# Patient Record
Sex: MALE | Race: OTHER | HISPANIC OR LATINO | Employment: FULL TIME | URBAN - METROPOLITAN AREA
[De-identification: names, ages, dates, MRNs, and addresses within clinical notes are randomized per-mention and may not be internally consistent; named-entity substitution may affect disease eponyms.]

---

## 2019-09-29 ENCOUNTER — APPOINTMENT (EMERGENCY)
Dept: RADIOLOGY | Facility: HOSPITAL | Age: 39
End: 2019-09-29

## 2019-09-29 ENCOUNTER — HOSPITAL ENCOUNTER (EMERGENCY)
Facility: HOSPITAL | Age: 39
Discharge: HOME | End: 2019-09-29
Attending: EMERGENCY MEDICINE

## 2019-09-29 VITALS
SYSTOLIC BLOOD PRESSURE: 119 MMHG | DIASTOLIC BLOOD PRESSURE: 67 MMHG | OXYGEN SATURATION: 98 % | HEART RATE: 78 BPM | TEMPERATURE: 97.6 F | RESPIRATION RATE: 18 BRPM

## 2019-09-29 DIAGNOSIS — L03.115 CELLULITIS OF RIGHT FOOT: Primary | ICD-10-CM

## 2019-09-29 PROCEDURE — 99283 EMERGENCY DEPT VISIT LOW MDM: CPT

## 2019-09-29 PROCEDURE — 73630 X-RAY EXAM OF FOOT: CPT

## 2019-09-29 PROCEDURE — 99284 EMERGENCY DEPT VISIT MOD MDM: CPT | Performed by: EMERGENCY MEDICINE

## 2019-09-29 RX ORDER — CEPHALEXIN 250 MG/1
500 CAPSULE ORAL ONCE
Status: COMPLETED | OUTPATIENT
Start: 2019-09-29 | End: 2019-09-29

## 2019-09-29 RX ORDER — SULFAMETHOXAZOLE AND TRIMETHOPRIM 800; 160 MG/1; MG/1
1 TABLET ORAL ONCE
Status: COMPLETED | OUTPATIENT
Start: 2019-09-29 | End: 2019-09-29

## 2019-09-29 RX ORDER — SULFAMETHOXAZOLE AND TRIMETHOPRIM 800; 160 MG/1; MG/1
1 TABLET ORAL 2 TIMES DAILY
Qty: 14 TABLET | Refills: 0 | Status: SHIPPED | OUTPATIENT
Start: 2019-09-29 | End: 2019-10-04 | Stop reason: HOSPADM

## 2019-09-29 RX ORDER — CEPHALEXIN 250 MG/1
500 CAPSULE ORAL EVERY 6 HOURS SCHEDULED
Qty: 56 CAPSULE | Refills: 0 | Status: SHIPPED | OUTPATIENT
Start: 2019-09-29 | End: 2019-10-04 | Stop reason: HOSPADM

## 2019-09-29 RX ADMIN — SULFAMETHOXAZOLE AND TRIMETHOPRIM 1 TABLET: 800; 160 TABLET ORAL at 16:41

## 2019-09-29 RX ADMIN — CEPHALEXIN 500 MG: 250 CAPSULE ORAL at 16:41

## 2019-09-29 NOTE — ED PROVIDER NOTES
Pt Name: Shelly Knight  MRN: [id number]  [place] 1980  Age/Sex: 35 y o  male  Date of evaluation: 9/29/2019  PCP: No primary care provider on file  CHIEF COMPLAINT    Chief Complaint   Patient presents with    Foot Swelling     pt c/o right foot swelling and pain for the past 2 days  HPI    35 y o  male presenting with 2 days of right foot pain and swelling  Patient notes pain and itching between the 4th and 5th toes on the right foot, with increasing pain and swelling and redness that began today  He denies fevers, nausea vomiting diarrhea, chest pain shortness of breath, other symptoms  HPI      Past Medical and Surgical History    History reviewed  No pertinent past medical history  History reviewed  No pertinent surgical history  History reviewed  No pertinent family history  Social History     Tobacco Use    Smoking status: Never Smoker    Smokeless tobacco: Never Used   Substance Use Topics    Alcohol use: Not on file    Drug use: Not on file           Allergies    No Known Allergies    Home Medications    Prior to Admission medications    Not on File           Review of Systems    Review of Systems   Constitutional: Negative for appetite change, chills and diaphoresis  HENT: Negative for drooling, facial swelling, trouble swallowing and voice change  Respiratory: Negative for apnea, shortness of breath and wheezing  Cardiovascular: Negative for chest pain and leg swelling  Gastrointestinal: Negative for abdominal distention, abdominal pain, diarrhea, nausea and vomiting  Genitourinary: Negative for dysuria and urgency  Musculoskeletal: Positive for back pain and joint swelling  Negative for arthralgias, gait problem and neck pain  Skin: Negative for color change, rash and wound  Neurological: Negative for seizures, speech difficulty, weakness and headaches  Psychiatric/Behavioral: Negative for agitation, behavioral problems and dysphoric mood   The patient is not nervous/anxious  All other systems reviewed and negative  Physical Exam      ED Triage Vitals [09/29/19 1550]   Temperature Pulse Respirations Blood Pressure SpO2   97 6 °F (36 4 °C) 78 18 119/67 98 %      Temp Source Heart Rate Source Patient Position - Orthostatic VS BP Location FiO2 (%)   Oral Monitor Lying Left arm --      Pain Score       --               Physical Exam   Constitutional: He is oriented to person, place, and time  He appears well-developed and well-nourished  HENT:   Head: Normocephalic and atraumatic  Eyes: Pupils are equal, round, and reactive to light  Conjunctivae and EOM are normal    Neck: Normal range of motion  Neck supple  No tracheal deviation present  Cardiovascular: Normal rate, regular rhythm, normal heart sounds and intact distal pulses  No murmur heard  Pulmonary/Chest: Effort normal and breath sounds normal  No stridor  No respiratory distress  He has no wheezes  He has no rales  Abdominal: Soft  He exhibits no distension  There is no tenderness  There is no rebound and no guarding  Musculoskeletal: Normal range of motion  He exhibits edema and tenderness  He exhibits no deformity  Lateral aspect of right foot erythematous with tenderness to palpation but no pain out of proportion  No palpable fluctuance  Break in the skin and macerated appearing tissue noted between the 4th and 5th digits, no purulent drainage or deep open wound  Neurological: He is alert and oriented to person, place, and time  Skin: Skin is warm and dry  No rash noted  There is erythema  Psychiatric: He has a normal mood and affect  His behavior is normal  Judgment and thought content normal    Nursing note and vitals reviewed  Diagnostic Results      Labs:    No results found for this or any previous visit      All labs reviewed and utilized in the medical decision making process    Radiology:    XR foot 3+ views RIGHT    (Results Pending)       All radiology studies independently viewed by me and interpreted by the radiologist     Procedure    Procedures        ED Course of Care and Re-Assessments      Patient started on Bactrim and Keflex for cellulitis  Medications   sulfamethoxazole-trimethoprim (BACTRIM DS) 800-160 mg per tablet 1 tablet (1 tablet Oral Given 9/29/19 1641)   cephalexin (KEFLEX) capsule 500 mg (500 mg Oral Given 9/29/19 1641)           FINAL IMPRESSION    Final diagnoses:   Cellulitis of right foot         DISPOSITION/PLAN    Presentation most consistent with cellulitis of the right foot likely started by break in skin from chronically wet and macerated tissue  Patient counseled regarding keeping the area clean as well as dry, toes  with gauze to provide for good airflow, recommended wearing with or without sites of into the wound heals  Started on Bactrim and Keflex for cellulitis no area of fluctuance identified amenable to surgical drainage, plain films reassuring  Do not suspect abscess, osteomyelitis, sepsis, necrotizing soft tissue infection, other acute threat to life or limb at this time  Discharged strict return precautions, follow up with primary care doctor in podiatry as needed  Time reflects when diagnosis was documented in both MDM as applicable and the Disposition within this note     Time User Action Codes Description Comment    9/29/2019  5:09 PM Steven Prieto Add [L03 115] Cellulitis of right foot       ED Disposition     ED Disposition Condition Date/Time Comment    Discharge Stable Sun Sep 29, 2019  4:41 PM Stanley Guillen discharge to home/self care              Follow-up Information     Follow up With Specialties Details Why Contact Info Additional Information    [facility]  Call in 1 day To schedule followup if your symptoms are not improving over the next 48-72 hours [address] Emergency Department Emergency Medicine Go to  If symptoms worsen [address] [facility] [address]  [phone] [place] ED, [address] [facility], [address], [address]            PATIENT REFERRED TO:    [address] [facility] [address]  [address] [facility] [address] [phone]  Call in 1 day  To schedule followup if your symptoms are not improving over the next 48-72 hours    [facility] Emergency Department  [address] [facility] [id number] [phone]  Go to   If symptoms worsen      DISCHARGE MEDICATIONS:    Discharge Medication List as of 9/29/2019  5:11 PM      START taking these medications    Details   cephalexin (KEFLEX) 250 mg capsule Take 2 capsules (500 mg total) by mouth every 6 (six) hours for 7 days, Starting Sun 9/29/2019, Until Sun 10/6/2019, Print      sulfamethoxazole-trimethoprim (BACTRIM DS) 800-160 mg per tablet Take 1 tablet by mouth 2 (two) times a day for 7 days smx-tmp DS (BACTRIM) 800-160 mg tabs (1tab q12 D10), Starting Sun 9/29/2019, Until Sun 10/6/2019, Print             No discharge procedures on file           MD Susan Cotton MD  09/29/19 436

## 2019-10-02 ENCOUNTER — APPOINTMENT (OUTPATIENT)
Dept: RADIOLOGY | Facility: HOSPITAL | Age: 39
DRG: 603 | End: 2019-10-02

## 2019-10-02 ENCOUNTER — HOSPITAL ENCOUNTER (INPATIENT)
Facility: HOSPITAL | Age: 39
LOS: 1 days | Discharge: HOME | DRG: 603 | End: 2019-10-04
Attending: EMERGENCY MEDICINE | Admitting: INTERNAL MEDICINE

## 2019-10-02 DIAGNOSIS — L03.031 CELLULITIS AND ABSCESS OF TOE OF RIGHT FOOT: ICD-10-CM

## 2019-10-02 DIAGNOSIS — L02.611 CELLULITIS AND ABSCESS OF TOE OF RIGHT FOOT: ICD-10-CM

## 2019-10-02 DIAGNOSIS — L03.119 CELLULITIS AND ABSCESS OF FOOT: Primary | ICD-10-CM

## 2019-10-02 DIAGNOSIS — L02.619 CELLULITIS AND ABSCESS OF FOOT: Primary | ICD-10-CM

## 2019-10-02 PROBLEM — B35.3 TINEA PEDIS: Status: ACTIVE | Noted: 2019-10-02

## 2019-10-02 PROBLEM — L03.032 CELLULITIS AND ABSCESS OF TOE OF LEFT FOOT: Status: ACTIVE | Noted: 2019-10-02

## 2019-10-02 PROBLEM — L02.612 CELLULITIS AND ABSCESS OF TOE OF LEFT FOOT: Status: ACTIVE | Noted: 2019-10-02

## 2019-10-02 LAB
ALBUMIN SERPL BCP-MCNC: 3.3 G/DL (ref 3.5–5)
ALBUMIN SERPL BCP-MCNC: 3.8 G/DL (ref 3.5–5)
ALP SERPL-CCNC: 58 U/L (ref 46–116)
ALP SERPL-CCNC: 65 U/L (ref 46–116)
ALT SERPL W P-5'-P-CCNC: 23 U/L (ref 12–78)
ALT SERPL W P-5'-P-CCNC: 30 U/L (ref 12–78)
ANION GAP SERPL CALCULATED.3IONS-SCNC: 12 MMOL/L (ref 4–13)
ANION GAP SERPL CALCULATED.3IONS-SCNC: 8 MMOL/L (ref 4–13)
AST SERPL W P-5'-P-CCNC: 14 U/L (ref 5–45)
AST SERPL W P-5'-P-CCNC: 19 U/L (ref 5–45)
BASOPHILS # BLD AUTO: 0.06 THOUSANDS/ΜL (ref 0–0.1)
BASOPHILS # BLD AUTO: 0.06 THOUSANDS/ΜL (ref 0–0.1)
BASOPHILS NFR BLD AUTO: 0 % (ref 0–1)
BASOPHILS NFR BLD AUTO: 1 % (ref 0–1)
BILIRUB SERPL-MCNC: 0.5 MG/DL (ref 0.2–1)
BILIRUB SERPL-MCNC: 0.6 MG/DL (ref 0.2–1)
BUN SERPL-MCNC: 5 MG/DL (ref 5–25)
BUN SERPL-MCNC: 7 MG/DL (ref 5–25)
CALCIUM SERPL-MCNC: 8.2 MG/DL (ref 8.3–10.1)
CALCIUM SERPL-MCNC: 8.8 MG/DL (ref 8.3–10.1)
CHLORIDE SERPL-SCNC: 102 MMOL/L (ref 100–108)
CHLORIDE SERPL-SCNC: 104 MMOL/L (ref 100–108)
CO2 SERPL-SCNC: 25 MMOL/L (ref 21–32)
CO2 SERPL-SCNC: 26 MMOL/L (ref 21–32)
CREAT SERPL-MCNC: 1.07 MG/DL (ref 0.6–1.3)
CREAT SERPL-MCNC: 1.26 MG/DL (ref 0.6–1.3)
EOSINOPHIL # BLD AUTO: 0.21 THOUSAND/ΜL (ref 0–0.61)
EOSINOPHIL # BLD AUTO: 0.22 THOUSAND/ΜL (ref 0–0.61)
EOSINOPHIL NFR BLD AUTO: 2 % (ref 0–6)
EOSINOPHIL NFR BLD AUTO: 2 % (ref 0–6)
ERYTHROCYTE [DISTWIDTH] IN BLOOD BY AUTOMATED COUNT: 12.6 % (ref 11.6–15.1)
ERYTHROCYTE [DISTWIDTH] IN BLOOD BY AUTOMATED COUNT: 12.7 % (ref 11.6–15.1)
EST. AVERAGE GLUCOSE BLD GHB EST-MCNC: 120 MG/DL
GFR SERPL CREATININE-BSD FRML MDRD: 71 ML/MIN/1.73SQ M
GFR SERPL CREATININE-BSD FRML MDRD: 86 ML/MIN/1.73SQ M
GLUCOSE SERPL-MCNC: 110 MG/DL (ref 65–140)
GLUCOSE SERPL-MCNC: 113 MG/DL (ref 65–140)
HBA1C MFR BLD: 5.8 % (ref 4.2–6.3)
HCT VFR BLD AUTO: 41.6 % (ref 36.5–49.3)
HCT VFR BLD AUTO: 45.8 % (ref 36.5–49.3)
HGB BLD-MCNC: 13.9 G/DL (ref 12–17)
HGB BLD-MCNC: 15.2 G/DL (ref 12–17)
IMM GRANULOCYTES # BLD AUTO: 0.05 THOUSAND/UL (ref 0–0.2)
IMM GRANULOCYTES # BLD AUTO: 0.08 THOUSAND/UL (ref 0–0.2)
IMM GRANULOCYTES NFR BLD AUTO: 0 % (ref 0–2)
IMM GRANULOCYTES NFR BLD AUTO: 1 % (ref 0–2)
LYMPHOCYTES # BLD AUTO: 2.84 THOUSANDS/ΜL (ref 0.6–4.47)
LYMPHOCYTES # BLD AUTO: 2.91 THOUSANDS/ΜL (ref 0.6–4.47)
LYMPHOCYTES NFR BLD AUTO: 20 % (ref 14–44)
LYMPHOCYTES NFR BLD AUTO: 22 % (ref 14–44)
MAGNESIUM SERPL-MCNC: 2.1 MG/DL (ref 1.6–2.6)
MCH RBC QN AUTO: 29.9 PG (ref 26.8–34.3)
MCH RBC QN AUTO: 29.9 PG (ref 26.8–34.3)
MCHC RBC AUTO-ENTMCNC: 33.2 G/DL (ref 31.4–37.4)
MCHC RBC AUTO-ENTMCNC: 33.4 G/DL (ref 31.4–37.4)
MCV RBC AUTO: 90 FL (ref 82–98)
MCV RBC AUTO: 90 FL (ref 82–98)
MONOCYTES # BLD AUTO: 1.13 THOUSAND/ΜL (ref 0.17–1.22)
MONOCYTES # BLD AUTO: 1.27 THOUSAND/ΜL (ref 0.17–1.22)
MONOCYTES NFR BLD AUTO: 10 % (ref 4–12)
MONOCYTES NFR BLD AUTO: 8 % (ref 4–12)
NEUTROPHILS # BLD AUTO: 8.47 THOUSANDS/ΜL (ref 1.85–7.62)
NEUTROPHILS # BLD AUTO: 9.84 THOUSANDS/ΜL (ref 1.85–7.62)
NEUTS SEG NFR BLD AUTO: 65 % (ref 43–75)
NEUTS SEG NFR BLD AUTO: 69 % (ref 43–75)
NRBC BLD AUTO-RTO: 0 /100 WBCS
NRBC BLD AUTO-RTO: 0 /100 WBCS
PHOSPHATE SERPL-MCNC: 3.2 MG/DL (ref 2.7–4.5)
PLATELET # BLD AUTO: 331 THOUSANDS/UL (ref 149–390)
PLATELET # BLD AUTO: 380 THOUSANDS/UL (ref 149–390)
PMV BLD AUTO: 8.9 FL (ref 8.9–12.7)
PMV BLD AUTO: 9 FL (ref 8.9–12.7)
POTASSIUM SERPL-SCNC: 3.9 MMOL/L (ref 3.5–5.3)
POTASSIUM SERPL-SCNC: 4 MMOL/L (ref 3.5–5.3)
PROT SERPL-MCNC: 6.6 G/DL (ref 6.4–8.2)
PROT SERPL-MCNC: 7.6 G/DL (ref 6.4–8.2)
RBC # BLD AUTO: 4.65 MILLION/UL (ref 3.88–5.62)
RBC # BLD AUTO: 5.09 MILLION/UL (ref 3.88–5.62)
SODIUM SERPL-SCNC: 138 MMOL/L (ref 136–145)
SODIUM SERPL-SCNC: 139 MMOL/L (ref 136–145)
WBC # BLD AUTO: 12.9 THOUSAND/UL (ref 4.31–10.16)
WBC # BLD AUTO: 14.24 THOUSAND/UL (ref 4.31–10.16)

## 2019-10-02 PROCEDURE — 99285 EMERGENCY DEPT VISIT HI MDM: CPT | Performed by: EMERGENCY MEDICINE

## 2019-10-02 PROCEDURE — 87147 CULTURE TYPE IMMUNOLOGIC: CPT | Performed by: EMERGENCY MEDICINE

## 2019-10-02 PROCEDURE — 87070 CULTURE OTHR SPECIMN AEROBIC: CPT | Performed by: EMERGENCY MEDICINE

## 2019-10-02 PROCEDURE — 80053 COMPREHEN METABOLIC PANEL: CPT | Performed by: PHYSICIAN ASSISTANT

## 2019-10-02 PROCEDURE — 99220 PR INITIAL OBSERVATION CARE/DAY 70 MINUTES: CPT | Performed by: INTERNAL MEDICINE

## 2019-10-02 PROCEDURE — 0H9MXZZ DRAINAGE OF RIGHT FOOT SKIN, EXTERNAL APPROACH: ICD-10-PCS | Performed by: EMERGENCY MEDICINE

## 2019-10-02 PROCEDURE — 85025 COMPLETE CBC W/AUTO DIFF WBC: CPT | Performed by: PHYSICIAN ASSISTANT

## 2019-10-02 PROCEDURE — 36415 COLL VENOUS BLD VENIPUNCTURE: CPT | Performed by: EMERGENCY MEDICINE

## 2019-10-02 PROCEDURE — 83735 ASSAY OF MAGNESIUM: CPT | Performed by: PHYSICIAN ASSISTANT

## 2019-10-02 PROCEDURE — 99284 EMERGENCY DEPT VISIT MOD MDM: CPT

## 2019-10-02 PROCEDURE — 96365 THER/PROPH/DIAG IV INF INIT: CPT

## 2019-10-02 PROCEDURE — 73620 X-RAY EXAM OF FOOT: CPT

## 2019-10-02 PROCEDURE — 10060 I&D ABSCESS SIMPLE/SINGLE: CPT | Performed by: EMERGENCY MEDICINE

## 2019-10-02 PROCEDURE — 87040 BLOOD CULTURE FOR BACTERIA: CPT | Performed by: EMERGENCY MEDICINE

## 2019-10-02 PROCEDURE — 99254 IP/OBS CNSLTJ NEW/EST MOD 60: CPT | Performed by: INTERNAL MEDICINE

## 2019-10-02 PROCEDURE — 87186 SC STD MICRODIL/AGAR DIL: CPT | Performed by: EMERGENCY MEDICINE

## 2019-10-02 PROCEDURE — 85025 COMPLETE CBC W/AUTO DIFF WBC: CPT | Performed by: EMERGENCY MEDICINE

## 2019-10-02 PROCEDURE — 87205 SMEAR GRAM STAIN: CPT | Performed by: EMERGENCY MEDICINE

## 2019-10-02 PROCEDURE — 96375 TX/PRO/DX INJ NEW DRUG ADDON: CPT

## 2019-10-02 PROCEDURE — 84100 ASSAY OF PHOSPHORUS: CPT | Performed by: PHYSICIAN ASSISTANT

## 2019-10-02 PROCEDURE — 80053 COMPREHEN METABOLIC PANEL: CPT | Performed by: EMERGENCY MEDICINE

## 2019-10-02 PROCEDURE — 83036 HEMOGLOBIN GLYCOSYLATED A1C: CPT | Performed by: PHYSICIAN ASSISTANT

## 2019-10-02 PROCEDURE — 87077 CULTURE AEROBIC IDENTIFY: CPT | Performed by: EMERGENCY MEDICINE

## 2019-10-02 RX ORDER — ACETAMINOPHEN 325 MG/1
650 TABLET ORAL EVERY 6 HOURS PRN
Status: DISCONTINUED | OUTPATIENT
Start: 2019-10-02 | End: 2019-10-04 | Stop reason: HOSPADM

## 2019-10-02 RX ORDER — CLOTRIMAZOLE 1 %
CREAM (GRAM) TOPICAL 2 TIMES DAILY
Status: DISCONTINUED | OUTPATIENT
Start: 2019-10-02 | End: 2019-10-04 | Stop reason: HOSPADM

## 2019-10-02 RX ORDER — HYDROMORPHONE HCL/PF 1 MG/ML
0.5 SYRINGE (ML) INJECTION ONCE
Status: COMPLETED | OUTPATIENT
Start: 2019-10-02 | End: 2019-10-02

## 2019-10-02 RX ORDER — ONDANSETRON 2 MG/ML
4 INJECTION INTRAMUSCULAR; INTRAVENOUS ONCE
Status: COMPLETED | OUTPATIENT
Start: 2019-10-02 | End: 2019-10-02

## 2019-10-02 RX ORDER — HYDROMORPHONE HCL/PF 1 MG/ML
1 SYRINGE (ML) INJECTION EVERY 4 HOURS PRN
Status: DISCONTINUED | OUTPATIENT
Start: 2019-10-02 | End: 2019-10-04 | Stop reason: HOSPADM

## 2019-10-02 RX ORDER — HYDROMORPHONE HCL/PF 1 MG/ML
0.5 SYRINGE (ML) INJECTION EVERY 4 HOURS PRN
Status: DISCONTINUED | OUTPATIENT
Start: 2019-10-02 | End: 2019-10-04 | Stop reason: HOSPADM

## 2019-10-02 RX ORDER — LIDOCAINE HYDROCHLORIDE 20 MG/ML
10 INJECTION, SOLUTION EPIDURAL; INFILTRATION; INTRACAUDAL; PERINEURAL ONCE
Status: COMPLETED | OUTPATIENT
Start: 2019-10-02 | End: 2019-10-02

## 2019-10-02 RX ORDER — ONDANSETRON 2 MG/ML
4 INJECTION INTRAMUSCULAR; INTRAVENOUS EVERY 4 HOURS PRN
Status: DISCONTINUED | OUTPATIENT
Start: 2019-10-02 | End: 2019-10-04 | Stop reason: HOSPADM

## 2019-10-02 RX ORDER — OXYCODONE HYDROCHLORIDE AND ACETAMINOPHEN 5; 325 MG/1; MG/1
1 TABLET ORAL EVERY 4 HOURS PRN
Status: DISCONTINUED | OUTPATIENT
Start: 2019-10-02 | End: 2019-10-04 | Stop reason: HOSPADM

## 2019-10-02 RX ORDER — CLINDAMYCIN PHOSPHATE 600 MG/50ML
600 INJECTION INTRAVENOUS EVERY 6 HOURS
Status: DISCONTINUED | OUTPATIENT
Start: 2019-10-02 | End: 2019-10-02

## 2019-10-02 RX ORDER — CLINDAMYCIN PHOSPHATE 600 MG/50ML
600 INJECTION INTRAVENOUS ONCE
Status: COMPLETED | OUTPATIENT
Start: 2019-10-02 | End: 2019-10-02

## 2019-10-02 RX ADMIN — CLINDAMYCIN PHOSPHATE 600 MG: 600 INJECTION, SOLUTION INTRAVENOUS at 02:23

## 2019-10-02 RX ADMIN — ONDANSETRON 4 MG: 2 INJECTION INTRAMUSCULAR; INTRAVENOUS at 02:22

## 2019-10-02 RX ADMIN — SODIUM CHLORIDE 1000 ML: 0.9 INJECTION, SOLUTION INTRAVENOUS at 02:22

## 2019-10-02 RX ADMIN — VANCOMYCIN HYDROCHLORIDE 1250 MG: 5 INJECTION, POWDER, LYOPHILIZED, FOR SOLUTION INTRAVENOUS at 09:13

## 2019-10-02 RX ADMIN — VANCOMYCIN HYDROCHLORIDE 1250 MG: 5 INJECTION, POWDER, LYOPHILIZED, FOR SOLUTION INTRAVENOUS at 17:58

## 2019-10-02 RX ADMIN — LIDOCAINE HYDROCHLORIDE 10 ML: 20 INJECTION, SOLUTION EPIDURAL; INFILTRATION; INTRACAUDAL; PERINEURAL at 02:55

## 2019-10-02 RX ADMIN — HYDROMORPHONE HYDROCHLORIDE 0.5 MG: 1 INJECTION, SOLUTION INTRAMUSCULAR; INTRAVENOUS; SUBCUTANEOUS at 02:22

## 2019-10-02 RX ADMIN — CLOTRIMAZOLE: 10 CREAM TOPICAL at 18:02

## 2019-10-02 NOTE — ED NOTES
Lotrimin cream not received from [facility], RN  10/02/19 6684

## 2019-10-02 NOTE — ASSESSMENT & PLAN NOTE
· 54-year-old male with no prior past medical history presents with an abscess and cellulitis between 4th and 5th toe of the right foot, he failed outpatient antibiotics, will now be admitted to [facility] for IV antibiotics  · He was started on Cleocin in the ED, will continue q 6 hours  · Follow-up with blood culture and wound culture  · Podiatry consult  · Pain control  · Antiemetics  · Ambulate with assistance  · Supportive care

## 2019-10-02 NOTE — SOCIAL WORK
Cm met with patient at bedside, patient alert and oriented during interview  Patient reports residing in a private home that is functional for him, he is completely independent with his ADL's, no dme, and drives  No reports of MH/SA  Patient stated at this time he is not interested in following up with a PCP and is not on any current medications  Patient stated he can use either [facility] or [facility] for any discharge medications  Patient understands he may have follow appointments and will self pay  Cm team will continue to follow and assess for needs  CM reviewed discharge planning process including the following: identifying help at home, patient preference for discharge planning needs, pharmacy preference, and availability of treatment team to discuss questions or concerns patient and/or family may have regarding understanding medications and recognizing signs and symptoms once discharged  CM also encouraged patient to follow up with all recommended appointments after discharge  Patient advised of importance for patient and family to participate in managing patients medical well being

## 2019-10-02 NOTE — PROGRESS NOTES
Vancomycin Assessment    Hawa Robertson is a 35 y o  male who is currently receiving vancomycin 1750 mg q12h for       Relevant clinical data and objective history reviewed:  Creatinine   Date Value Ref Range Status   10/02/2019 1 07 0 60 - 1 30 mg/dL Final     Comment:     Standardized to IDMS reference method   10/02/2019 1 26 0 60 - 1 30 mg/dL Final     Comment:     Standardized to IDMS reference method     /67 (BP Location: Right arm)   Pulse 88   Temp 97 6 °F (36 4 °C) (Oral)   Resp 16   Ht 5' 11" (1 803 m)   Wt 113 kg (249 lb 12 5 oz)   SpO2 97%   BMI 34 84 kg/m²   I/O last 3 completed shifts: In: 2050 [IV Piggyback:2050]  Out: -   Lab Results   Component Value Date/Time    BUN 5 10/02/2019 08:10 AM    WBC 12 90 (H) 10/02/2019 08:10 AM    HGB 13 9 10/02/2019 08:10 AM    HCT 41 6 10/02/2019 08:10 AM    MCV 90 10/02/2019 08:10 AM     10/02/2019 08:10 AM     Temp Readings from Last 3 Encounters:   10/02/19 97 6 °F (36 4 °C) (Oral)   09/29/19 97 6 °F (36 4 °C) (Oral)     Vancomycin Days of Therapy: 1    Assessment/Plan  The patient is currently on vancomycin utilizing scheduled dosing based on adjusted body weight (due to obesity)  Baseline risks associated with therapy include: none   The patient is currently receiving 1750 mg q12h and after clinical evaluation will be changed to 1250 mg q8h  Pharmacy will also follow closely for s/sx of nephrotoxicity, infusion reactions and appropriateness of therapy  BMP and CBC will be ordered per protocol  Plan for trough as patient approaches steady state, prior to the 4th  dose at approximately 10/03/  Due to infection severity, will target a trough of 15-20 (appropriate for most indications)   Pharmacy will continue to follow the patients culture results and clinical progress daily      Eric Mckeon, Pharmacist

## 2019-10-02 NOTE — ED NOTES
Pt called this nurse into the room and stated he threw up from the vancomycin it immediately made him nauseous  Pt offered zofran but he refused and patient also refusing antibiotics to continue        Gudelia Garcia RN  10/02/19 2283

## 2019-10-02 NOTE — CONSULTS
Consultation - Infectious Disease   Lazarus Flor 35 y o  male MRN: [id number]  Unit/Bed#: ED 25 Encounter: [id number]      IMPRESSION & RECOMMENDATIONS:   Impression/Recommendations:  1  Right foot cellulitis  Due to # 3  Consider underlying DM although no known history  Lack of clinical improvement despite Keflex, Bactrim  Consider need for IV antibiotics  Consider deeper abscess  X-ray negative for osteomyelitis  Rec:  · Continue vancomycin for now  · Pharmacy consult for vancomycin dosing  · Await podiatry consultation  Suspect may need wound exploration versus further imaging with CT versus MRI to evaluate for possible abscess  · Follow up wound culture  · Follow up blood cultures  · Follow temperatures closely  · Recheck CBC as below  · Follow up A1c per primary to evaluate for underlying diabetes  2   Leukocytosis  Due to # 1  Does not need formal sepsis criteria  Blood cultures pending  Clinically stable and nontoxic  Rec:  · Continue antibiotics as above  · Follow up blood cultures as above  · Recheck CBC    3  Tinea pedis  Bilateral 4/5 interdigital web space  Rec:  · Topical antifungals    Antibiotics:  Vancomycin # 1    Thank you for this consultation  We will follow along with you  HISTORY OF PRESENT ILLNESS:  Reason for Consult:  Right foot cellulitis    HPI: Lazarus Flor is a 35 y o  male previously healthy  He was initially seen in the ED on 09/29 complaining of 2 days of right foot swelling and pain  He was noted to have macerated skin in the interdigital web spaces  He states that he has wet skin between his 4th and 5th toes for some time which he attributes to tight fitting socks and wearing sneakers all day  He was discharged home on Keflex and Bactrim  He returned to the emergency department earlier today with worsening symptoms  Upon presentation he was noted to be afebrile with a normal heart rate  His labs revealed a leukocytosis    He underwent a right foot x-ray which showed no bony abnormality  He was given a dose of clindamycin and started on vancomycin  We are asked to comment on further evaluation and management  Of note patient relays vomiting after receiving clindamycin  Did okay with vancomycin  Patient agreeable to continue  In performing this consult, I have reviewed prior admission and outpatient visit records in detail  REVIEW OF SYSTEMS:  Had painful lump in right groin which is now improved with IV antibiotics  Denies fevers, chills, sweats, nausea, vomiting, or diarrhea  A complete system-based review of systems is otherwise negative  PAST MEDICAL HISTORY:  History reviewed  No pertinent past medical history  History reviewed  No pertinent surgical history  FAMILY HISTORY:  Non-contributory    SOCIAL HISTORY:  Social History     Substance and Sexual Activity   Alcohol Use Not Currently     Social History     Substance and Sexual Activity   Drug Use Never     Social History     Tobacco Use   Smoking Status Never Smoker   Smokeless Tobacco Never Used       ALLERGIES:  No Known Allergies    MEDICATIONS:  All current active medications have been reviewed      PHYSICAL EXAM:  Vitals:  Temp:  [97 6 °F (36 4 °C)-98 1 °F (36 7 °C)] 98 1 °F (36 7 °C)  HR:  [83-88] 83  Resp:  [16] 16  BP: (108-125)/(56-67) 108/56  SpO2:  [96 %-97 %] 96 %  Temp (24hrs), Av 9 °F (36 6 °C), Min:97 6 °F (36 4 °C), Max:98 1 °F (36 7 °C)  Current: Temperature: 98 1 °F (36 7 °C)     Physical Exam:  General:  Well-nourished, well-developed, in no acute distress  Eyes:  Conjunctive clear with no hemorrhages or effusions  Oropharynx:  No ulcers, no lesions  Neck:  Supple, no lymphadenopathy  Lungs:  Clear to auscultation bilaterally, no accessory muscle use  Cardiac:  Regular rate and rhythm, no murmurs  Abdomen:  Soft, non-tender, non-distended  Extremities:  No peripheral cyanosis, clubbing, or edema  Skin:  No rashes, no ulcers  Neurological:  Moves all four extremities spontaneously, sensation grossly intact  Right foot:  Deep fissure right 4/5 interdigital web spaces with purulence drainage  Warmth and dark erythema extending to dorsum of foot with tenderness to palpation  Left foot:  Tinea pedis between 4/5 interdigital web spaces    LABS, IMAGING, & OTHER STUDIES:  Lab Results:  I have personally reviewed pertinent labs  Results from last 7 days   Lab Units 10/02/19  0810 10/02/19  0204   POTASSIUM mmol/L 3 9 4 0   CHLORIDE mmol/L 104 102   CO2 mmol/L 26 25   BUN mg/dL 5 7   CREATININE mg/dL 1 07 1 26   EGFR ml/min/1 73sq m 86 71   CALCIUM mg/dL 8 2* 8 8   AST U/L 14 19   ALT U/L 23 30   ALK PHOS U/L 58 65     Results from last 7 days   Lab Units 10/02/19  0810 10/02/19  0204   WBC Thousand/uL 12 90* 14 24*   HEMOGLOBIN g/dL 13 9 15 2   PLATELETS Thousands/uL 331 380           Imaging Studies:   I have personally reviewed pertinent imaging study reports and images in PACS  X-ray right foot reviewed personally no osseous abnormality    EKG, Pathology, and Other Studies:   I have personally reviewed pertinent reports

## 2019-10-02 NOTE — PLAN OF CARE
Problem: PAIN - ADULT  Goal: Verbalizes/displays adequate comfort level or baseline comfort level  Description  Interventions:  - Encourage patient to monitor pain and request assistance  - Assess pain using appropriate pain scale  - Administer analgesics based on type and severity of pain and evaluate response  - Implement non-pharmacological measures as appropriate and evaluate response  - Consider cultural and social influences on pain and pain management  - Notify physician/advanced practitioner if interventions unsuccessful or patient reports new pain  Outcome: Progressing     Problem: INFECTION - ADULT  Goal: Absence or prevention of progression during hospitalization  Description  INTERVENTIONS:  - Assess and monitor for signs and symptoms of infection  - Monitor lab/diagnostic results  - Monitor all insertion sites, i e  indwelling lines, tubes, and drains  - Monitor endotracheal if appropriate and nasal secretions for changes in amount and color  - [place] appropriate cooling/warming therapies per order  - Administer medications as ordered  - Instruct and encourage patient and family to use good hand hygiene technique  - Identify and instruct in appropriate isolation precautions for identified infection/condition  Outcome: Progressing  Goal: Absence of fever/infection during neutropenic period  Description  INTERVENTIONS:  - Monitor WBC    Outcome: Progressing     Problem: SAFETY ADULT  Goal: Patient will remain free of falls  Description  INTERVENTIONS:  - Assess patient frequently for physical needs  -  Identify cognitive and physical deficits and behaviors that affect risk of falls    -  [place] fall precautions as indicated by assessment   - Educate patient/family on patient safety including physical limitations  - Instruct patient to call for assistance with activity based on assessment  - Modify environment to reduce risk of injury  - Consider OT/PT consult to assist with strengthening/mobility  Outcome: Progressing  Goal: Maintain or return to baseline ADL function  Description  INTERVENTIONS:  -  Assess patient's ability to carry out ADLs; assess patient's baseline for ADL function and identify physical deficits which impact ability to perform ADLs (bathing, care of mouth/teeth, toileting, grooming, dressing, etc )  - Assess/evaluate cause of self-care deficits   - Assess range of motion  - Assess patient's mobility; develop plan if impaired  - Assess patient's need for assistive devices and provide as appropriate  - Encourage maximum independence but intervene and supervise when necessary  - Involve family in performance of ADLs  - Assess for home care needs following discharge   - Consider OT consult to assist with ADL evaluation and planning for discharge  - Provide patient education as appropriate  Outcome: Progressing  Goal: Maintain or return mobility status to optimal level  Description  INTERVENTIONS:  - Assess patient's baseline mobility status (ambulation, transfers, stairs, etc )    - Identify cognitive and physical deficits and behaviors that affect mobility  - Identify mobility aids required to assist with transfers and/or ambulation (gait belt, sit-to-stand, lift, walker, cane, etc )  - [place] fall precautions as indicated by assessment  - Record patient progress and toleration of activity level on Mobility SBAR; progress patient to next Phase/Stage  - Instruct patient to call for assistance with activity based on assessment  - Consider rehabilitation consult to assist with strengthening/weightbearing, etc   Outcome: Progressing     Problem: DISCHARGE PLANNING  Goal: Discharge to home or other facility with appropriate resources  Description  INTERVENTIONS:  - Identify barriers to discharge w/patient and caregiver  - Arrange for needed discharge resources and transportation as appropriate  - Identify discharge learning needs (meds, wound care, etc )  - Arrange for interpretive services to assist at discharge as needed  - Refer to Case Management Department for coordinating discharge planning if the patient needs post-hospital services based on physician/advanced practitioner order or complex needs related to functional status, cognitive ability, or social support system  Outcome: Progressing     Problem: Knowledge Deficit  Goal: Patient/family/caregiver demonstrates understanding of disease process, treatment plan, medications, and discharge instructions  Description  Complete learning assessment and assess knowledge base    Interventions:  - Provide teaching at level of understanding  - Provide teaching via preferred learning methods  Outcome: Progressing

## 2019-10-02 NOTE — ED PROVIDER NOTES
Pt Name: Edgar Miller  MRN: [id number]  [place] 1980  Age/Sex: 35 y o  male  Date of evaluation: 10/2/2019  PCP: No primary care provider on file  CHIEF COMPLAINT    Chief Complaint   Patient presents with    Foot Swelling     Pt states he was seen here for foot swelling two days ago and feels it is getting worse          HPI    35 y o  male presenting with right foot swelling  The symptoms have been going on for 5 days, was seen 3 days ago and started on Bactrim and Keflex, patient states his symptoms steadily worsened since then  He has had spreading redness and significantly increasing swelling, states he is now having difficulty walking cannot put any weight on that foot  Denies fevers, nausea, vomiting, diarrhea, chest pain, shortness of breath, other symptoms  HPI      Past Medical and Surgical History    History reviewed  No pertinent past medical history  History reviewed  No pertinent surgical history  History reviewed  No pertinent family history  Social History     Tobacco Use    Smoking status: Never Smoker    Smokeless tobacco: Never Used   Substance Use Topics    Alcohol use: Not Currently    Drug use: Never           Allergies    No Known Allergies    Home Medications    Prior to Admission medications    Medication Sig Start Date End Date Taking? Authorizing Provider   cephalexin (KEFLEX) 250 mg capsule Take 2 capsules (500 mg total) by mouth every 6 (six) hours for 7 days 9/29/19 10/6/19  Maria Isabel Sams MD   sulfamethoxazole-trimethoprim (BACTRIM DS) 800-160 mg per tablet Take 1 tablet by mouth 2 (two) times a day for 7 days smx-tmp DS (BACTRIM) 800-160 mg tabs (1tab q12 D10) 9/29/19 10/6/19  Maria Isabel Sams MD           Review of Systems    Review of Systems   Constitutional: Negative for appetite change, chills and diaphoresis  HENT: Negative for drooling, facial swelling, trouble swallowing and voice change      Respiratory: Negative for apnea, shortness of breath and wheezing  Cardiovascular: Negative for chest pain and leg swelling  Gastrointestinal: Negative for abdominal distention, abdominal pain, diarrhea, nausea and vomiting  Genitourinary: Negative for dysuria and urgency  Musculoskeletal: Positive for gait problem and joint swelling  Negative for arthralgias, back pain and neck pain  Skin: Positive for rash  Negative for color change and wound  Neurological: Negative for seizures, speech difficulty, weakness and headaches  Psychiatric/Behavioral: Negative for agitation, behavioral problems and dysphoric mood  The patient is not nervous/anxious  All other systems reviewed and negative  Physical Exam      ED Triage Vitals   Temperature Pulse Respirations Blood Pressure SpO2   10/02/19 0141 10/02/19 0141 10/02/19 0141 10/02/19 0141 10/02/19 0141   97 6 °F (36 4 °C) 88 16 125/67 97 %      Temp Source Heart Rate Source Patient Position - Orthostatic VS BP Location FiO2 (%)   10/02/19 0141 10/02/19 0141 -- 10/02/19 0141 --   Oral Monitor  Right arm       Pain Score       10/02/19 0222       Worst Possible Pain               Physical Exam   Constitutional: He is oriented to person, place, and time  He appears well-developed and well-nourished  HENT:   Head: Normocephalic and atraumatic  Mouth/Throat: Oropharynx is clear and moist    Eyes: Pupils are equal, round, and reactive to light  Conjunctivae and EOM are normal    Neck: Normal range of motion  Neck supple  No tracheal deviation present  Cardiovascular: Normal rate, regular rhythm, normal heart sounds and intact distal pulses  No murmur heard  Pulmonary/Chest: Effort normal and breath sounds normal  No stridor  No respiratory distress  He has no wheezes  He has no rales  Abdominal: Soft  He exhibits no distension  There is no tenderness  There is no rebound and no guarding  Musculoskeletal: Normal range of motion  He exhibits edema and tenderness   He exhibits no deformity  Significant edema of the right foot, exquisitely tender to palpation in the lateral aspect, significant swelling  Erythema streaking 2/3 the way up the foot  Neurological: He is alert and oriented to person, place, and time  Skin: Skin is warm and dry  No rash noted  There is erythema  Psychiatric: He has a normal mood and affect  His behavior is normal  Judgment and thought content normal    Nursing note and vitals reviewed             Diagnostic Results      Labs:    Results for orders placed or performed during the hospital encounter of 10/02/19   CBC and differential   Result Value Ref Range    WBC 14 24 (H) 4 31 - 10 16 Thousand/uL    RBC 5 09 3 88 - 5 62 Million/uL    Hemoglobin 15 2 12 0 - 17 0 g/dL    Hematocrit 45 8 36 5 - 49 3 %    MCV 90 82 - 98 fL    MCH 29 9 26 8 - 34 3 pg    MCHC 33 2 31 4 - 37 4 g/dL    RDW 12 6 11 6 - 15 1 %    MPV 9 0 8 9 - 12 7 fL    Platelets 359 800 - 882 Thousands/uL    nRBC 0 /100 WBCs    Neutrophils Relative 69 43 - 75 %    Immat GRANS % 1 0 - 2 %    Lymphocytes Relative 20 14 - 44 %    Monocytes Relative 8 4 - 12 %    Eosinophils Relative 2 0 - 6 %    Basophils Relative 0 0 - 1 %    Neutrophils Absolute 9 84 (H) 1 85 - 7 62 Thousands/µL    Immature Grans Absolute 0 08 0 00 - 0 20 Thousand/uL    Lymphocytes Absolute 2 91 0 60 - 4 47 Thousands/µL    Monocytes Absolute 1 13 0 17 - 1 22 Thousand/µL    Eosinophils Absolute 0 22 0 00 - 0 61 Thousand/µL    Basophils Absolute 0 06 0 00 - 0 10 Thousands/µL   Comprehensive metabolic panel   Result Value Ref Range    Sodium 139 136 - 145 mmol/L    Potassium 4 0 3 5 - 5 3 mmol/L    Chloride 102 100 - 108 mmol/L    CO2 25 21 - 32 mmol/L    ANION GAP 12 4 - 13 mmol/L    BUN 7 5 - 25 mg/dL    Creatinine 1 26 0 60 - 1 30 mg/dL    Glucose 110 65 - 140 mg/dL    Calcium 8 8 8 3 - 10 1 mg/dL    AST 19 5 - 45 U/L    ALT 30 12 - 78 U/L    Alkaline Phosphatase 65 46 - 116 U/L    Total Protein 7 6 6 4 - 8 2 g/dL    Albumin 3 8 3 5 - 5 0 g/dL    Total Bilirubin 0 50 0 20 - 1 00 mg/dL    eGFR 71 ml/min/1 73sq m       All labs reviewed and utilized in the medical decision making process    Radiology:    XR foot 2 views RIGHT    (Results Pending)       All radiology studies independently viewed by me and interpreted by the radiologist     Procedure    Incision and drain  Date/Time: 10/2/2019 3:03 AM  Performed by: Juana Garibay MD  Authorized by: Juana Garibay MD     Patient location:  ED  Consent:     Consent obtained:  Verbal    Consent given by:  Patient    Risks discussed:  Damage to other organs, bleeding, infection, incomplete drainage and pain    Alternatives discussed:  Alternative treatment and delayed treatment  Universal protocol:     Patient identity confirmed:  Provided demographic data  Location:     Type:  Abscess    Size:  2 cm    Location:  Lower extremity    Lower extremity location:  R foot  Pre-procedure details:     Skin preparation:  Betadine  Anesthesia (see MAR for exact dosages): Anesthesia method:  Local infiltration    Local anesthetic:  Lidocaine 2% w/o epi  Procedure details:     Complexity:  Intermediate    Needle aspiration: no      Incision types:  Elliptical    Scalpel blade:  11    Approach:  Open    Incision depth:  Submucosal    Wound management:  Probed and deloculated, irrigated with saline and debrided (debrided with 11 blade and iris scissors)    Irrigation with saline:  Syringe    Hemostat:  Loculations broken up    Drainage:  Purulent    Drainage amount: Moderate    Wound treatment:  Wound left open    Packing materials:  None  Post-procedure details:     Patient tolerance of procedure: Tolerated well, no immediate complications            ED Course of Care and Re-Assessments      Symptoms improved substantially after incision and drainage of the foot and with hydromorphone  Started on IV clindamycin      Medications   sodium chloride 0 9 % bolus 1,000 mL (1,000 mL Intravenous New Bag 10/2/19 0222)   lidocaine (PF) (XYLOCAINE-MPF) 2 % injection 10 mL (10 mL Infiltration Given 10/2/19 0255)   HYDROmorphone (DILAUDID) injection 0 5 mg (0 5 mg Intravenous Given 10/2/19 0222)   ondansetron (ZOFRAN) injection 4 mg (4 mg Intravenous Given 10/2/19 0222)   clindamycin (CLEOCIN) IVPB (premix) 600 mg (0 mg Intravenous Stopped 10/2/19 0254)           FINAL IMPRESSION    Final diagnoses:   Cellulitis and abscess of foot - right         DISPOSITION/PLAN    Worsening pain and swelling of right foot despite treat with Bactrim and Keflex over 72 hour period  Vital signs reassuring, examination remarkable for interval worsening of swelling and erythema of the foot  Based on failure of outpatient antibiotics, some concern for abscess, after discussion of risks and benefits to include alternative of delayed care by podiatrist, patient opted for incision and drainage at this time, which was performed with no apparent complications and returned purulent drainage as above  Symptoms improved after incision and drainage  Based on significant cellulitis as well as outpatient treatment failure, as well as leukocytosis, admitted for observation and potentially to facilitate rapid podiatry evaluation  Hemodynamically stable and comfortable at time of admit  Time reflects when diagnosis was documented in both MDM as applicable and the Disposition within this note     Time User Action Codes Description Comment    10/2/2019  3:01 AM Usha ROGERS Add [L03 119,  L02 619] Cellulitis and abscess of foot     10/2/2019  3:01 AM Kari Chaudhry Modify [L03 119,  L02 619] Cellulitis and abscess of foot right      ED Disposition     ED Disposition Condition Date/Time Comment    Admit Stable Wed Oct 2, 2019  3:00 AM Case was discussed with SONJA and the patient's admission status was agreed to be Admission Status: observation status to the service of Dr Trung Hernadez           Follow-up Information    None           PATIENT REFERRED TO:    No follow-up provider specified  DISCHARGE MEDICATIONS:    Patient's Medications   Discharge Prescriptions    No medications on file       No discharge procedures on file           MD Ayan Shaw MD  10/02/19 [id number]

## 2019-10-02 NOTE — ED NOTES
1  CC: foot swelling that has become worse from the other day     2  Is this admission due to an injury?: no    3  Orientation status: AxO's 4    4  Abnormal labs, assessment, vitals: pt with swelling and redness to left foot  Vancomycin made him throw up within minutes, refused to finish dose    5  Medications/drips: N/A    6  Last time narcotics given: 0 5 dilaudid 02:22    7  IV lines, drains, etc : 20 RAC    8  Isolation status: N/A    9  Skin: intact    10  Ambulation status: ambulatory without difficulty or assistance    11   ED phone number Chris Alfaro, RN  10/02/19 [id number]

## 2019-10-02 NOTE — H&P
H&P- Hiral Foster 1980, 35 y o  male MRN: [id number]    Unit/Bed#: ED 25 Encounter: [id number]    Primary Care Provider: No primary care provider on file  Date and time admitted to hospital: 10/2/2019  1:37 AM        Cellulitis and abscess of toe of right foot  Assessment & Plan  · 79-year-old male with no prior past medical history presents with an abscess and cellulitis between 4th and 5th toe of the right foot, he failed outpatient antibiotics, will now be admitted to [facility] for IV antibiotics  · He was started on Cleocin in the ED, will continue q 6 hours  · Follow-up with blood culture and wound culture  · Podiatry consult  · Pain control  · Antiemetics  · Ambulate with assistance  · Supportive care      VTE Prophylaxis: Enoxaparin (Lovenox)  / sequential compression device   Code Status:  Full code  POLST: POLST is not applicable to this patient  Discussion with family:     Anticipated Length of Stay:  Patient will be admitted on an Observation basis with an anticipated length of stay of   2 midnights  Justification for Hospital Stay:  Cellulitis and abscess of the left foot    Total Time for Visit, including Counseling / Coordination of Care: 1 hour  Greater than 50% of this total time spent on direct patient counseling and coordination of care  Chief Complaint:   Foot pain    History of Present Illness:    Hiral Foster is a 35 y o  male with no significant past medical history who is being admitted for cellulitis/abscess in between the the 4th and 5th toes of the right foot  Patient was recently discharged with Bactrim and Keflex from the ED 3 days ago for cellulitis of the right foot  He was instructed to return if symptoms worsen or persist   Patient presented back to the ED this evening now complaining of pain when he walks and increased swelling  Small I&D was done in the ED in which is small amount of pus was drained    Patient denies any fever, chills, calf swelling, shortness of breath, chest pain or recent trauma  He will be admitted for IV antibiotics and Podiatry consult  Review of Systems:    Review of Systems   Constitutional: Positive for activity change  HENT: Negative  Eyes: Negative  Respiratory: Negative  Cardiovascular: Positive for leg swelling  Negative for chest pain and palpitations  Gastrointestinal: Negative  Endocrine: Negative  Genitourinary: Negative  Musculoskeletal: Positive for gait problem  Negative for arthralgias, back pain, joint swelling, myalgias, neck pain and neck stiffness  Skin: Positive for color change and wound  Negative for pallor and rash  Psychiatric/Behavioral: Negative  All other systems reviewed and are negative  Past Medical and Surgical History:     History reviewed  No pertinent past medical history  History reviewed  No pertinent surgical history  Meds/Allergies:    Prior to Admission medications    Medication Sig Start Date End Date Taking? Authorizing Provider   cephalexin (KEFLEX) 250 mg capsule Take 2 capsules (500 mg total) by mouth every 6 (six) hours for 7 days 9/29/19 10/6/19 Yes Naomi Wilkins MD   sulfamethoxazole-trimethoprim (BACTRIM DS) 800-160 mg per tablet Take 1 tablet by mouth 2 (two) times a day for 7 days smx-tmp DS (BACTRIM) 800-160 mg tabs (1tab q12 D10) 9/29/19 10/6/19 Yes Naomi Wilkins MD     No meds on file to review    Allergies: No Known Allergies    Social History:     Marital Status: Single     Substance Use History:   Social History     Substance and Sexual Activity   Alcohol Use Not Currently     Social History     Tobacco Use   Smoking Status Never Smoker   Smokeless Tobacco Never Used     Social History     Substance and Sexual Activity   Drug Use Never       Family History:    History reviewed  No pertinent family history      Physical Exam:     Vitals:   Blood Pressure: 125/67 (10/02/19 0141)  Pulse: 88 (10/02/19 0141)  Temperature: 97 6 °F (36 4 °C) (10/02/19 0141)  Temp Source: Oral (10/02/19 0141)  Respirations: 16 (10/02/19 0141)  Height: 5' 11" (180 3 cm) (10/02/19 0142)  Weight - Scale: 113 kg (249 lb 12 5 oz) (10/02/19 0142)  SpO2: 97 % (10/02/19 0141)    Physical Exam   Constitutional: He is oriented to person, place, and time  No distress  Neck: Normal range of motion  Cardiovascular: Normal rate, regular rhythm, normal heart sounds and intact distal pulses  Exam reveals no friction rub  No murmur heard  Pulmonary/Chest: Effort normal and breath sounds normal  No stridor  No respiratory distress  He has no wheezes  He has no rales  He exhibits no tenderness  Abdominal: Soft  Bowel sounds are normal    Musculoskeletal: He exhibits edema  Increased swelling and warmth on right foot, pus foul smelling draining from in between the 4th and 5th right foot  Neurological: He is alert and oriented to person, place, and time  Skin: Skin is warm and dry  Additional Data:     Lab Results: I have personally reviewed pertinent reports  Results from last 7 days   Lab Units 10/02/19  0204   WBC Thousand/uL 14 24*   HEMOGLOBIN g/dL 15 2   HEMATOCRIT % 45 8   PLATELETS Thousands/uL 380   NEUTROS PCT % 69   LYMPHS PCT % 20   MONOS PCT % 8   EOS PCT % 2     Results from last 7 days   Lab Units 10/02/19  0204   SODIUM mmol/L 139   POTASSIUM mmol/L 4 0   CHLORIDE mmol/L 102   CO2 mmol/L 25   BUN mg/dL 7   CREATININE mg/dL 1 26   ANION GAP mmol/L 12   CALCIUM mg/dL 8 8   ALBUMIN g/dL 3 8   TOTAL BILIRUBIN mg/dL 0 50   ALK PHOS U/L 65   ALT U/L 30   AST U/L 19   GLUCOSE RANDOM mg/dL 110                       Imaging: I have personally reviewed pertinent films in PACS    XR foot 2 views RIGHT    (Results Pending)       EKG, Pathology, and Other Studies Reviewed on Admission:   · EKG:     Allscripts / Epic Records Reviewed: Yes     ** Please Note: This note has been constructed using a voice recognition system   **

## 2019-10-03 PROBLEM — E66.01 MORBID OBESITY DUE TO EXCESS CALORIES (HCC): Status: ACTIVE | Noted: 2019-10-03

## 2019-10-03 LAB
ANION GAP SERPL CALCULATED.3IONS-SCNC: 9 MMOL/L (ref 4–13)
BUN SERPL-MCNC: 8 MG/DL (ref 5–25)
CALCIUM SERPL-MCNC: 8.6 MG/DL (ref 8.3–10.1)
CHLORIDE SERPL-SCNC: 106 MMOL/L (ref 100–108)
CO2 SERPL-SCNC: 25 MMOL/L (ref 21–32)
CREAT SERPL-MCNC: 0.99 MG/DL (ref 0.6–1.3)
GFR SERPL CREATININE-BSD FRML MDRD: 95 ML/MIN/1.73SQ M
GLUCOSE P FAST SERPL-MCNC: 145 MG/DL (ref 65–99)
GLUCOSE SERPL-MCNC: 145 MG/DL (ref 65–140)
POTASSIUM SERPL-SCNC: 4.1 MMOL/L (ref 3.5–5.3)
SODIUM SERPL-SCNC: 140 MMOL/L (ref 136–145)
VANCOMYCIN TROUGH SERPL-MCNC: 7.8 UG/ML (ref 10–20)

## 2019-10-03 PROCEDURE — 80048 BASIC METABOLIC PNL TOTAL CA: CPT | Performed by: INTERNAL MEDICINE

## 2019-10-03 PROCEDURE — 99233 SBSQ HOSP IP/OBS HIGH 50: CPT | Performed by: INTERNAL MEDICINE

## 2019-10-03 PROCEDURE — 80202 ASSAY OF VANCOMYCIN: CPT | Performed by: INTERNAL MEDICINE

## 2019-10-03 RX ADMIN — VANCOMYCIN HYDROCHLORIDE 1500 MG: 1 INJECTION, POWDER, LYOPHILIZED, FOR SOLUTION INTRAVENOUS at 10:27

## 2019-10-03 RX ADMIN — VANCOMYCIN HYDROCHLORIDE 1250 MG: 5 INJECTION, POWDER, LYOPHILIZED, FOR SOLUTION INTRAVENOUS at 00:55

## 2019-10-03 RX ADMIN — CLOTRIMAZOLE 1 APPLICATION: 10 CREAM TOPICAL at 19:37

## 2019-10-03 RX ADMIN — CEFTRIAXONE SODIUM 1000 MG: 10 INJECTION, POWDER, FOR SOLUTION INTRAVENOUS at 16:22

## 2019-10-03 NOTE — PROGRESS NOTES
Vancomycin Assessment    Dara Sy is a 35 y o  male who is currently receiving vancomycin vancomycin 1250 mg IV Q8H for       Relevant clinical data and objective history reviewed:  Creatinine   Date Value Ref Range Status   10/03/2019 0 99 0 60 - 1 30 mg/dL Final     Comment:     Standardized to IDMS reference method   10/02/2019 1 07 0 60 - 1 30 mg/dL Final     Comment:     Standardized to IDMS reference method   10/02/2019 1 26 0 60 - 1 30 mg/dL Final     Comment:     Standardized to IDMS reference method     /60 (BP Location: Left arm)   Pulse 80   Temp 98 2 °F (36 8 °C) (Oral)   Resp 20   Ht 5' 11" (1 803 m)   Wt 117 kg (257 lb 11 5 oz)   SpO2 96%   BMI 35 94 kg/m²   I/O last 3 completed shifts: In: 2600 [P O :540; IV Piggyback:2060]  Out: -   Lab Results   Component Value Date/Time    BUN 8 10/03/2019 05:06 AM    WBC 12 90 (H) 10/02/2019 08:10 AM    HGB 13 9 10/02/2019 08:10 AM    HCT 41 6 10/02/2019 08:10 AM    MCV 90 10/02/2019 08:10 AM     10/02/2019 08:10 AM     Temp Readings from Last 3 Encounters:   10/03/19 98 2 °F (36 8 °C) (Oral)   09/29/19 97 6 °F (36 4 °C) (Oral)     Vancomycin Days of Therapy: 2    Assessment/Plan  The patient is currently on vancomycin utilizing scheduled dosing  Baseline risks associated with therapy include: none   The patient is receiving vancomycin 1250 mg IV Q8H with the most recent vancomycin level being at steady-state and sub-therapeutic based on a goal of 15-20 (appropriate for most indications) ; therefore, after clinical evaluation will be changed to vancomycin 1500 mg IV Q6H   Pharmacy will continue to follow closely for s/sx of nephrotoxicity, infusion reactions and appropriateness of therapy  BMP and CBC will be ordered per protocol  Plan for trough as patient approaches steady state, prior to the 5th  dose approximately on 10/4/19 at 0930   Pharmacy will continue to follow the patients culture results and clinical progress daily      Jovana Marquis, Pharmacist

## 2019-10-03 NOTE — PROGRESS NOTES
Progress Note - Infectious Disease   Maycol Hernandez 35 y o  male MRN: [id number]  Unit/Bed#: -01 Encounter: [id number]      Impression/Recommendations:  1  Right foot cellulitis/abscess  Due to # 3  Lack of clinical improvement despite Keflex, Bactrim likely due to need for I&D, IV antibiotics  X-ray negative for osteomyelitis  Wound culture with GBS, GNR  Slowly improving but still erythema of dorsal foot  Wound appears to probe deep  Blood cultures negative  Clinically stable without sepsis  Rec:  ? Change antibiotics to ceftriaxone  ? Await podiatry consultation  Patient agreeable to stay until evaluated  ? Follow up final wound culture and tailor antibiotics as indicated  ? Follow temperatures closely  ? If infection felt to be superficial per Podiatry, once clinically improved and cultures finalized, anticipate change to PO antibiotics with plan to complete 10 days total of antibiotics     2   Leukocytosis  Due to # 1  Does not need formal sepsis criteria  Blood cultures pending  Clinically stable and nontoxic  Rec:  ? Continue antibiotics as above  ? Follow up blood cultures as above  ? Recheck CBC     3  Tinea pedis  Bilateral 4/5 interdigital web space  Rec:  ? Topical antifungals     The above plan was discussed in detail with the patient and Dr Adenike Ibarra  Antibiotics:  Vancomycin #2    Subjective:  Patient seen on PM rounds  Thinks foot is getting better  Improved pain  Denies fevers, chills, sweats, nausea, vomiting, or diarrhea  Daughter's birthday party if tomorrow after school, but he realizes importance of staying in house to be seen by Podiatry and for cultures to finalize  24 Hour Events:  No documented fevers, chills, sweats, nausea, vomiting, or diarrhea  WBC trending down      Objective:  Vitals:  Temp:  [98 2 °F (36 8 °C)-98 7 °F (37 1 °C)] 98 2 °F (36 8 °C)  HR:  [56-95] 80  Resp:  [16-20] 20  BP: (116-130)/(57-74) 128/60  SpO2:  [96 %-99 %] 96 %  Temp (24hrs), Av 4 °F (36 9 °C), Min:98 2 °F (36 8 °C), Max:98 7 °F (37 1 °C)  Current: Temperature: 98 2 °F (36 8 °C)    Physical Exam:   General:  No acute distress  Psychiatric:  Awake and alert  Pulmonary:  Normal respiratory excursion without accessory muscle use  Abdomen:  Soft, nontender  Extremities:  Decreased intensity of erythema right foot but some spread upward on foot  Fissure between 4th/5th toes appears to extend deep  Skin:  No rashes    Lab Results:  I have personally reviewed pertinent labs  Results from last 7 days   Lab Units 10/03/19  0506 10/02/19  0810 10/02/19  0204   POTASSIUM mmol/L 4 1 3 9 4 0   CHLORIDE mmol/L 106 104 102   CO2 mmol/L 25 26 25   BUN mg/dL 8 5 7   CREATININE mg/dL 0 99 1 07 1 26   EGFR ml/min/1 73sq m 95 86 71   CALCIUM mg/dL 8 6 8 2* 8 8   AST U/L  --  14 19   ALT U/L  --  23 30   ALK PHOS U/L  --  58 65     Results from last 7 days   Lab Units 10/02/19  0810 10/02/19  0204   WBC Thousand/uL 12 90* 14 24*   HEMOGLOBIN g/dL 13 9 15 2   PLATELETS Thousands/uL 331 380     Results from last 7 days   Lab Units 10/02/19  0257 10/02/19  0220   BLOOD CULTURE   --  No Growth at 24 hrs  No Growth at 24 hrs  GRAM STAIN RESULT  No polys seen*  1+ Epithelial Cells*  1+ Gram positive cocci in pairs*  --        Imaging Studies:   I have personally reviewed pertinent imaging study reports and images in PACS  EKG, Pathology, and Other Studies:   I have personally reviewed pertinent reports

## 2019-10-03 NOTE — CONSULTS
Consult - Podiatry   Kaykay Mcgregor 35 y o  male MRN: [id number]  Unit/Bed#: -01 Encounter: [id number]    Assessment/Plan     Assessment:  Right foot cellulitis with superficial abscess right 4th toe webspace  Plan:  Reviewed chart and labs  Reviewed pathophysiology and treatment of this condition with the patient  Preliminary wound cultures show gram-negative ismael and beta-hemolytic strep  Patient had I and D in the ER  Patient is on IV antibiotics  Appearance is stable  Recommendation at this time is daily irrigation between the 4th and 5th  toes  Loose application of silver alginate/Maxorb Ag and to keep the area clean and loosely covered  Limited, guarded weight-bearing in postoperative shoe  Wound instructions have been given  It would probably be best if the patient would stay in the hospital until culture and sensitivities are finalized and white count is found to be continuing to trend downward  Patient should be discharged with wound care supplies and instructions for daily wound care  Post discharge antibiotics per ID pending results of C&S  Follow up in the podiatry office within 1 week  History of Present Illness     HPI:  Kaykay Mcgregor is a 35 y o  male who presents with increasing pain redness and swelling in the right foot  Reports present for a little over a week  Came to the ER earlier in the week and was given antibiotics but condition did not improve and in fact worsened  He reports significant decrease in pain since yesterday and feels his foot is less red and swollen since earlier this week  The patient reports a history of chronic interdigital athlete's foot but no previous history of bacterial infection  Consults  Review of Systems   Constitutional: Negative  HENT: Negative  Eyes: Negative  Respiratory: Negative  Cardiovascular: Negative  Gastrointestinal: Negative      Musculoskeletal:  Negative   Skin:  Reports chronic athlete's foot between the toes     Neurological: Negative  Psych: negative  Historical Information   History reviewed  No pertinent past medical history  History reviewed  No pertinent surgical history  Social History   Social History     Substance and Sexual Activity   Alcohol Use Not Currently    Frequency: Monthly or less    Drinks per session: 1 or 2    Binge frequency: Never     Social History     Substance and Sexual Activity   Drug Use Never     Social History     Tobacco Use   Smoking Status Current Every Day Smoker    Packs/day: 0 25    Years: 30 00    Pack years: 7 50   Smokeless Tobacco Never Used     Family History: History reviewed  No pertinent family history      Meds/Allergies   Medications Prior to Admission   Medication    cephalexin (KEFLEX) 250 mg capsule    sulfamethoxazole-trimethoprim (BACTRIM DS) 800-160 mg per tablet     No Known Allergies    Objective   First Vitals:   Blood Pressure: 125/67 (10/02/19 0141)  Pulse: 88 (10/02/19 0141)  Temperature: 97 6 °F (36 4 °C) (10/02/19 0141)  Temp Source: Oral (10/02/19 0141)  Respirations: 16 (10/02/19 0141)  Height: 5' 11" (180 3 cm) (10/02/19 0142)  Weight - Scale: 113 kg (249 lb 12 5 oz) (10/02/19 0142)  SpO2: 97 % (10/02/19 0141)    Current Vitals:   Blood Pressure: 128/60 (10/03/19 0718)  Pulse: 80 (10/03/19 0718)  Temperature: 98 2 °F (36 8 °C) (10/03/19 0718)  Temp Source: Oral (10/03/19 0718)  Respirations: 20 (10/03/19 0718)  Height: 5' 11" (180 3 cm) (10/02/19 1342)  Weight - Scale: 117 kg (257 lb 11 5 oz) (10/02/19 1342)  SpO2: 96 % (10/03/19 0718)        /60 (BP Location: Left arm)   Pulse 80   Temp 98 2 °F (36 8 °C) (Oral)   Resp 20   Ht 5' 11" (1 803 m)   Wt 117 kg (257 lb 11 5 oz)   SpO2 96%   BMI 35 94 kg/m²      General Appearance:    Alert, cooperative, no distress   Head:    Normocephalic, without obvious abnormality, atraumatic   Eyes:    PERRL, conjunctiva/corneas clear, EOM's intact        Nose:   Moist mucous membranes Neck:   Supple, symmetrical, trachea midline   Back:     Symmetric   Lungs:     Respirations unlabored   Heart:    Regular rate and rhythm, S1 and S2 normal, no murmur, rub   or gallop   Abdomen:     Soft, non-tender   Extremities: There are no acute deformity seen  There is no focal weakness  There is no acute pain to palpation or range of motion  Pulses:   Pedal pulses are palpable  Dorsalis pedis is +2/4, posterior tibialis is +2/4  Capillary filling time is brisk to all of the toes  There is no distal cyanosis or rubror  Skin:   There is distal edema and erythema in the right forefoot most intense in the dorsal lateral aspect of the foot  There is macerated loose epidermal tissue present  There is a small superficial wound consistent with recent I and D in the ER  There is no drainage or pus  There is local calor and induration  There is no palpable fluctuance or soft tissue crepitus  There is no ascending streak  There is no malodor  Neurologic:   Gross sensation is intact  Protective sensation is preserved             Lab Results:   Admission on 10/02/2019   Component Date Value    WBC 10/02/2019 14 24*    RBC 10/02/2019 5 09     Hemoglobin 10/02/2019 15 2     Hematocrit 10/02/2019 45 8     MCV 10/02/2019 90     MCH 10/02/2019 29 9     MCHC 10/02/2019 33 2     RDW 10/02/2019 12 6     MPV 10/02/2019 9 0     Platelets 48/51/2141 380     nRBC 10/02/2019 0     Neutrophils Relative 10/02/2019 69     Immat GRANS % 10/02/2019 1     Lymphocytes Relative 10/02/2019 20     Monocytes Relative 10/02/2019 8     Eosinophils Relative 10/02/2019 2     Basophils Relative 10/02/2019 0     Neutrophils Absolute 10/02/2019 9 84*    Immature Grans Absolute 10/02/2019 0 08     Lymphocytes Absolute 10/02/2019 2 91     Monocytes Absolute 10/02/2019 1 13     Eosinophils Absolute 10/02/2019 0 22     Basophils Absolute 10/02/2019 0 06     Sodium 10/02/2019 139     Potassium 10/02/2019 4 0  Chloride 10/02/2019 102     CO2 10/02/2019 25     ANION GAP 10/02/2019 12     BUN 10/02/2019 7     Creatinine 10/02/2019 1 26     Glucose 10/02/2019 110     Calcium 10/02/2019 8 8     AST 10/02/2019 19     ALT 10/02/2019 30     Alkaline Phosphatase 10/02/2019 65     Total Protein 10/02/2019 7 6     Albumin 10/02/2019 3 8     Total Bilirubin 10/02/2019 0 50     eGFR 10/02/2019 71     Blood Culture 10/02/2019 No Growth at 24 hrs   Blood Culture 10/02/2019 No Growth at 24 hrs       Wound Culture 10/02/2019 2+ Growth of Gram Negative Channing*    Wound Culture 10/02/2019 2+ Growth of Beta Hemolytic Streptococcus Group B*    Gram Stain Result 10/02/2019 No polys seen*    Gram Stain Result 10/02/2019 1+ Epithelial Cells*    Gram Stain Result 10/02/2019 1+ Gram positive cocci in pairs*    Hemoglobin A1C 10/02/2019 5 8     EAG 10/02/2019 120     Sodium 10/02/2019 138     Potassium 10/02/2019 3 9     Chloride 10/02/2019 104     CO2 10/02/2019 26     ANION GAP 10/02/2019 8     BUN 10/02/2019 5     Creatinine 10/02/2019 1 07     Glucose 10/02/2019 113     Calcium 10/02/2019 8 2*    AST 10/02/2019 14     ALT 10/02/2019 23     Alkaline Phosphatase 10/02/2019 58     Total Protein 10/02/2019 6 6     Albumin 10/02/2019 3 3*    Total Bilirubin 10/02/2019 0 60     eGFR 10/02/2019 86     Magnesium 10/02/2019 2 1     Phosphorus 10/02/2019 3 2     WBC 10/02/2019 12 90*    RBC 10/02/2019 4 65     Hemoglobin 10/02/2019 13 9     Hematocrit 10/02/2019 41 6     MCV 10/02/2019 90     MCH 10/02/2019 29 9     MCHC 10/02/2019 33 4     RDW 10/02/2019 12 7     MPV 10/02/2019 8 9     Platelets 38/35/9382 331     nRBC 10/02/2019 0     Neutrophils Relative 10/02/2019 65     Immat GRANS % 10/02/2019 0     Lymphocytes Relative 10/02/2019 22     Monocytes Relative 10/02/2019 10     Eosinophils Relative 10/02/2019 2     Basophils Relative 10/02/2019 1     Neutrophils Absolute 10/02/2019 8 47*    Immature Grans Absolute 10/02/2019 0 05     Lymphocytes Absolute 10/02/2019 2 84     Monocytes Absolute 10/02/2019 1 27*    Eosinophils Absolute 10/02/2019 0 21     Basophils Absolute 10/02/2019 0 06     Sodium 10/03/2019 140     Potassium 10/03/2019 4 1     Chloride 10/03/2019 106     CO2 10/03/2019 25     ANION GAP 10/03/2019 9     BUN 10/03/2019 8     Creatinine 10/03/2019 0 99     Glucose 10/03/2019 145*    Glucose, Fasting 10/03/2019 145*    Calcium 10/03/2019 8 6     eGFR 10/03/2019 95     Vancomycin Tr 10/03/2019 7 8*       Results from last 7 days   Lab Units 10/02/19  0257   GRAM STAIN RESULT  No polys seen*  1+ Epithelial Cells*  1+ Gram positive cocci in pairs*       Results from last 7 days   Lab Units 10/02/19  0220   BLOOD CULTURE  No Growth at 24 hrs  No Growth at 24 hrs  Invalid input(s): LABAEARO            Imaging: I have personally reviewed pertinent films in PACS  EKG, Pathology, and Other Studies: I have personally reviewed pertinent reports        Code Status: Level 1 - Full Code  Advance Directive and Living Will:      Power of :    POLST:

## 2019-10-03 NOTE — UTILIZATION REVIEW
Initial Clinical Review    OBS order 10/2 0301 converted to IP on 10/4 @ 0711 For continued IV abx and treatment of cellulitis /abscess of foot  Admitting Physician Mikayla Estrada    Level of Care Med Surg    Estimated length of stay More than 2 Midnights    Certification I certify that inpatient services are medically necessary for this patient for a duration of greater than two midnights  See H&P and MD Progress Notes for additional information about the patient's course of treatment  ED Arrival Information     Expected Arrival Acuity Means of Arrival Escorted By Service Admission Type    - 10/2/2019 01:27 Urgent Walk-In Family Member General Medicine Urgent    Arrival Complaint    Foot Swelling        Chief Complaint   Patient presents with    Foot Swelling     Pt states he was seen here for foot swelling two days ago and feels it is getting worse      Assessment/Plan: 36 yo male to ED from home w/ cellulitis /abscess bw 4th and 5th toes R foot  DC on bactrim and keflex 3 days ago for cellulitis   C/o inc pain when he walks  Small I&D done , small amt pus drained  Admitted OBS status for iv abx ,podiatry consult and supportive care   PE : Increased swelling and warmth on right foot, pus foul smelling draining from in between the 4th and 5th right foot  10/3 ID consult   R foot cellulitis Orin Cevallos  Lack of improvement despite keflex and bactrim   Wound appears to probe deep   bld cx neg   waiting on podiatry recommendations   Follow temps, f/u wound cx     10/3 IM note   Cellulitis and abscess R foot failed OP abx , wound cx + ,bld cx neg   10/3 Podiatry consult   Right foot cellulitis with superficial abscess right 4th toe webspace  Preliminary wound cultures show gram-negative ismael and beta-hemolytic strep  Recommendation at this time is daily irrigation between the 4th and 5th  toes  Loose application of silver alginate/Maxorb Ag and to keep the area clean and loosely covered    Limited, guarded weight-bearing in postoperative shoe  Wound instructions have been given  It would probably be best if the patient would stay in the hospital until culture and sensitivities are finalized and white count is found to be continuing to trend downward  ED Triage Vitals   Temperature Pulse Respirations Blood Pressure SpO2   10/02/19 0141 10/02/19 0141 10/02/19 0141 10/02/19 0141 10/02/19 0141   97 6 °F (36 4 °C) 88 16 125/67 97 %      Temp Source Heart Rate Source Patient Position - Orthostatic VS BP Location FiO2 (%)   10/02/19 0141 10/02/19 0141 10/02/19 0907 10/02/19 0141 --   Oral Monitor Lying Right arm       Pain Score       10/02/19 0222       Worst Possible Pain        Wt Readings from Last 1 Encounters:   10/02/19 117 kg (257 lb 11 5 oz)     Additional Vital Signs:   10/03/19 0718  98 2 °F (36 8 °C)  80  20      96 %  None (Room air)     10/02/19 2300  98 7 °F (37 1 °C)  85  20  130/62  88  97 %  None (Room air)  Lying   10/02/19 1450  98 4 °F (36 9 °C)  56  18  116/57  79  99 %  None (Room air)  Lying   10/02/19 1342  98 3 °F (36 8 °C)  73  18  123/60  86  97 %  None (Room air)  Lying   10/02/19 1333    95  16  120/74    98 %  None (Room air)  Sitting   10/02/19 0907  98 1 °F (36 7 °C)  83    108/56    96 %  None (Room air)         Pertinent Labs/Diagnostic Test Results:   10/2 R foot xray - No acute osseous abnormality    Results from last 7 days   Lab Units 10/02/19  0810 10/02/19  0204   WBC Thousand/uL 12 90* 14 24*   HEMOGLOBIN g/dL 13 9 15 2   HEMATOCRIT % 41 6 45 8   PLATELETS Thousands/uL 331 380   NEUTROS ABS Thousands/µL 8 47* 9 84*     Results from last 7 days   Lab Units 10/03/19  0506 10/02/19  0810 10/02/19  0204   SODIUM mmol/L 140 138 139   POTASSIUM mmol/L 4 1 3 9 4 0   CHLORIDE mmol/L 106 104 102   CO2 mmol/L 25 26 25   ANION GAP mmol/L 9 8 12   BUN mg/dL 8 5 7   CREATININE mg/dL 0 99 1 07 1 26   EGFR ml/min/1 73sq m 95 86 71   CALCIUM mg/dL 8 6 8 2* 8 8   MAGNESIUM mg/dL --  2 1  --    PHOSPHORUS mg/dL  --  3 2  --      Results from last 7 days   Lab Units 10/02/19  0810 10/02/19  0204   AST U/L 14 19   ALT U/L 23 30   ALK PHOS U/L 58 65   TOTAL PROTEIN g/dL 6 6 7 6   ALBUMIN g/dL 3 3* 3 8   TOTAL BILIRUBIN mg/dL 0 60 0 50     Results from last 7 days   Lab Units 10/03/19  0506 10/02/19  0810 10/02/19  0204   GLUCOSE RANDOM mg/dL 145* 113 110     Results from last 7 days   Lab Units 10/02/19  0204   HEMOGLOBIN A1C % 5 8   EAG mg/dl 120     Results from last 7 days   Lab Units 10/02/19  0257   GRAM STAIN RESULT  No polys seen*  1+ Epithelial Cells*  1+ Gram positive cocci in pairs*       ED Treatment:   Medication Administration from 10/02/2019 0127 to 10/02/2019 1342       Date/Time Order Dose Route Action     10/02/2019 0255 lidocaine (PF) (XYLOCAINE-MPF) 2 % injection 10 mL 10 mL Infiltration Given     10/02/2019 0222 HYDROmorphone (DILAUDID) injection 0 5 mg 0 5 mg Intravenous Given     10/02/2019 0222 ondansetron (ZOFRAN) injection 4 mg 4 mg Intravenous Given     10/02/2019 0222 sodium chloride 0 9 % bolus 1,000 mL 1,000 mL Intravenous New Bag     10/02/2019 0223 clindamycin (CLEOCIN) IVPB (premix) 600 mg 600 mg Intravenous New Bag     10/02/2019 0913 vancomycin (VANCOCIN) 1,250 mg in sodium chloride 0 9 % 250 mL IVPB 1,250 mg Intravenous New Bag        History reviewed  No pertinent past medical history    Present on Admission:  **None**      Admitting Diagnosis: Cellulitis and abscess of foot [L03 119, L02 619]  Foot swelling [M79 89]  Cellulitis and abscess of toe of right foot [L03 031, L02 611]  Age/Sex: 35 y o  male  Admission Orders:    Current Facility-Administered Medications:  acetaminophen 650 mg Oral Q6H PRN     clotrimazole  Topical BID     enoxaparin 40 mg Subcutaneous Daily     HYDROmorphone 0 5 mg Intravenous Q4H PRN     HYDROmorphone 1 mg Intravenous Q4H PRN     ondansetron 4 mg Intravenous Q4H PRN     oxyCODONE-acetaminophen 1 tablet Oral Q4H PRN vancomycin 15 mg/kg (Adjusted) Intravenous Q8H        Up and OOB as [place]ton   SCD  Reg diet   IP CONSULT TO PODIATRY  IP CONSULT TO PHARMACY  IP CONSULT TO INFECTIOUS DISEASES    Network Utilization Review Department  Phone: [phone]; Fax [phone]  [email or web address]  ATTENTION: Please call with any questions or concerns to [phone]  and carefully listen to the prompts so that you are directed to the right person  Send all requests for admission clinical reviews, approved or denied determinations and any other requests to fax [phone]   All voicemails are confidential

## 2019-10-03 NOTE — PROGRESS NOTES
Progress Note - Lazarus Flor 1980, 35 y o  male MRN: [id number]    Unit/Bed#: -01 Encounter: [id number]    Primary Care Provider: No primary care provider on file  Date and time admitted to hospital: 10/2/2019  1:37 AM        * Cellulitis and abscess of toe of right foot  Assessment & Plan  44-year-old male with no prior past medical history presents with an abscess and cellulitis between 4th and 5th toe of the right foot, he failed outpatient antibiotics    X-ray negative for osteomyelitis  Wound cultures with GBS, g negative rods  Cellulitis clinically improving still has erythema dorsal foot  Blood cultures so far negative  ID recommendations appreciated  Antibiotics changed to Rocephin  Podiatry consult pending  Morbid obesity due to excess calories (HCC)  Assessment & Plan  BMI 30 94  Outpatient bariatric surgery follow-up  Lifestyle and Dietary modification    Tinea pedis  Assessment & Plan  Continue topical antifungals    VTE Pharmacologic Prophylaxis:   Pharmacologic: Heparin  Mechanical VTE Prophylaxis in Place: Yes    Patient Centered Rounds: I have performed bedside rounds with nursing staff today  Discussions with Specialists or Other Care Team Provider:  Id and Podiatry    Education and Discussions with Family / Patient:  Patient    Time Spent for Care: 30 minutes  More than 50% of total time spent on counseling and coordination of care as described above  Current Length of Stay: 0 day(s)    Current Patient Status: Observation   Certification Statement: The patient will continue to require additional inpatient hospital stay due to Above medical problems    Discharge Plan:  Continue hospitalization, awaiting podiatry consultation  Code Status: Level 1 - Full Code      Subjective:   Patient seen and examined  His swelling or redness is slightly improved  Pain has improved as well  No fevers or chills  No other complaints  No diarrhea      Objective:     Vitals: Temp (24hrs), Av 4 °F (36 9 °C), Min:98 2 °F (36 8 °C), Max:98 7 °F (37 1 °C)    Temp:  [98 2 °F (36 8 °C)-98 7 °F (37 1 °C)] 98 2 °F (36 8 °C)  HR:  [56-85] 80  Resp:  [18-20] 20  BP: (116-130)/(57-62) 128/60  SpO2:  [96 %-99 %] 96 %  Body mass index is 35 94 kg/m²  Input and Output Summary (last 24 hours): Intake/Output Summary (Last 24 hours) at 10/3/2019 1424  Last data filed at 10/3/2019 1210  Gross per 24 hour   Intake 900 ml   Output 300 ml   Net 600 ml       Physical Exam:     Physical Exam   Constitutional: He is oriented to person, place, and time  He appears well-developed and well-nourished  No distress  HENT:   Head: Normocephalic  Eyes: Pupils are equal, round, and reactive to light  EOM are normal    Neck: Normal range of motion  Neck supple  Cardiovascular: Normal rate and regular rhythm  Pulmonary/Chest: Effort normal and breath sounds normal    Abdominal: Soft  Bowel sounds are normal    Musculoskeletal: He exhibits edema  Area of erythema on right foot improving space between 4th and 5th toes incision site intact   Neurological: He is alert and oriented to person, place, and time         Additional Data:     Labs:    Results from last 7 days   Lab Units 10/02/19  0810   WBC Thousand/uL 12 90*   HEMOGLOBIN g/dL 13 9   HEMATOCRIT % 41 6   PLATELETS Thousands/uL 331   NEUTROS PCT % 65   LYMPHS PCT % 22   MONOS PCT % 10   EOS PCT % 2     Results from last 7 days   Lab Units 10/03/19  0506 10/02/19  0810   SODIUM mmol/L 140 138   POTASSIUM mmol/L 4 1 3 9   CHLORIDE mmol/L 106 104   CO2 mmol/L 25 26   BUN mg/dL 8 5   CREATININE mg/dL 0 99 1 07   ANION GAP mmol/L 9 8   CALCIUM mg/dL 8 6 8 2*   ALBUMIN g/dL  --  3 3*   TOTAL BILIRUBIN mg/dL  --  0 60   ALK PHOS U/L  --  58   ALT U/L  --  23   AST U/L  --  14   GLUCOSE RANDOM mg/dL 145* 113             Results from last 7 days   Lab Units 10/02/19  0204   HEMOGLOBIN A1C % 5 8               * I Have Reviewed All Lab Data Listed Above   * Additional Pertinent Lab Tests Reviewed: Rosaline 66 Admission Reviewed    Imaging:    Imaging Reports Reviewed Today Include:   Imaging Personally Reviewed by Myself Includes:      Recent Cultures (last 7 days):     Results from last 7 days   Lab Units 10/02/19  0257 10/02/19  0220   BLOOD CULTURE   --  No Growth at 24 hrs  No Growth at 24 hrs  GRAM STAIN RESULT  No polys seen*  1+ Epithelial Cells*  1+ Gram positive cocci in pairs*  --    WOUND CULTURE  2+ Growth of Gram Negative Channing*  2+ Growth of Beta Hemolytic Streptococcus Group B*  --        Last 24 Hours Medication List:     Current Facility-Administered Medications:  acetaminophen 650 mg Oral Q6H PRN WILLIAN Aviles-C   cefTRIAXone 1,000 mg Intravenous Q24H Juani Nguyen MD   clotrimazole  Topical BID Marvel Kelly MD   enoxaparin 40 mg Subcutaneous Daily WILLIAN Aviles-SANDOVAL   HYDROmorphone 0 5 mg Intravenous Q4H PRN WILLIAN Aviles-C   HYDROmorphone 1 mg Intravenous Q4H PRN WILLIAN Ocasio-SANDOVAL   ondansetron 4 mg Intravenous Q4H PRN America Palacios PA-SANDOVAL   oxyCODONE-acetaminophen 1 tablet Oral Q4H PRN Lindsay Ding PA-C        Today, Patient Was Seen By: Marvel Kelly MD    ** Please Note: Dictation voice to text software may have been used in the creation of this document   **

## 2019-10-03 NOTE — PLAN OF CARE
Problem: PAIN - ADULT  Goal: Verbalizes/displays adequate comfort level or baseline comfort level  Description  Interventions:  - Encourage patient to monitor pain and request assistance  - Assess pain using appropriate pain scale  - Administer analgesics based on type and severity of pain and evaluate response  - Implement non-pharmacological measures as appropriate and evaluate response  - Consider cultural and social influences on pain and pain management  - Notify physician/advanced practitioner if interventions unsuccessful or patient reports new pain  10/2/2019 3066 by Elvin Pandey RN  Outcome: Progressing  10/2/2019 6389 by Reece Baum RN  Outcome: Progressing     Problem: INFECTION - ADULT  Goal: Absence or prevention of progression during hospitalization  Description  INTERVENTIONS:  - Assess and monitor for signs and symptoms of infection  - Monitor lab/diagnostic results  - Monitor all insertion sites, i e  indwelling lines, tubes, and drains  - Monitor endotracheal if appropriate and nasal secretions for changes in amount and color  - [place] appropriate cooling/warming therapies per order  - Administer medications as ordered  - Instruct and encourage patient and family to use good hand hygiene technique  - Identify and instruct in appropriate isolation precautions for identified infection/condition  10/2/2019 3680 by Elvin Pandey RN  Outcome: Progressing  10/2/2019 2642 by Elvin Pandey RN  Outcome: Progressing  Goal: Absence of fever/infection during neutropenic period  Description  INTERVENTIONS:  - Monitor WBC    10/2/2019 5318 by Elvin Pandey RN  Outcome: Progressing  10/2/2019 1688 by Reece Baum RN  Outcome: Progressing     Problem: SAFETY ADULT  Goal: Patient will remain free of falls  Description  INTERVENTIONS:  - Assess patient frequently for physical needs  -  Identify cognitive and physical deficits and behaviors that affect risk of falls    -  [place] fall precautions as indicated by assessment   - Educate patient/family on patient safety including physical limitations  - Instruct patient to call for assistance with activity based on assessment  - Modify environment to reduce risk of injury  - Consider OT/PT consult to assist with strengthening/mobility  10/2/2019 9519 by Stephanie Espinosa RN  Outcome: Progressing  10/2/2019 3441 by Stephanie Espinosa RN  Outcome: Progressing  Goal: Maintain or return to baseline ADL function  Description  INTERVENTIONS:  -  Assess patient's ability to carry out ADLs; assess patient's baseline for ADL function and identify physical deficits which impact ability to perform ADLs (bathing, care of mouth/teeth, toileting, grooming, dressing, etc )  - Assess/evaluate cause of self-care deficits   - Assess range of motion  - Assess patient's mobility; develop plan if impaired  - Assess patient's need for assistive devices and provide as appropriate  - Encourage maximum independence but intervene and supervise when necessary  - Involve family in performance of ADLs  - Assess for home care needs following discharge   - Consider OT consult to assist with ADL evaluation and planning for discharge  - Provide patient education as appropriate  10/2/2019 1747 by Stephanie Espinosa RN  Outcome: Progressing  10/2/2019 1398 by Stephanie Espinosa RN  Outcome: Progressing  Goal: Maintain or return mobility status to optimal level  Description  INTERVENTIONS:  - Assess patient's baseline mobility status (ambulation, transfers, stairs, etc )    - Identify cognitive and physical deficits and behaviors that affect mobility  - Identify mobility aids required to assist with transfers and/or ambulation (gait belt, sit-to-stand, lift, walker, cane, etc )  - [place] fall precautions as indicated by assessment  - Record patient progress and toleration of activity level on Mobility SBAR; progress patient to next Phase/Stage  - Instruct patient to call for assistance with activity based on assessment  - Consider rehabilitation consult to assist with strengthening/weightbearing, etc   10/2/2019 5780 by Jonathan Jensen RN  Outcome: Progressing  10/2/2019 8480 by Jonathan Jensen RN  Outcome: Progressing     Problem: DISCHARGE PLANNING  Goal: Discharge to home or other facility with appropriate resources  Description  INTERVENTIONS:  - Identify barriers to discharge w/patient and caregiver  - Arrange for needed discharge resources and transportation as appropriate  - Identify discharge learning needs (meds, wound care, etc )  - Arrange for interpretive services to assist at discharge as needed  - Refer to Case Management Department for coordinating discharge planning if the patient needs post-hospital services based on physician/advanced practitioner order or complex needs related to functional status, cognitive ability, or social support system  10/2/2019 4321 by Jonathan Jensen RN  Outcome: Progressing  10/2/2019 4285 by Jesús Baum RN  Outcome: Progressing     Problem: Knowledge Deficit  Goal: Patient/family/caregiver demonstrates understanding of disease process, treatment plan, medications, and discharge instructions  Description  Complete learning assessment and assess knowledge base    Interventions:  - Provide teaching at level of understanding  - Provide teaching via preferred learning methods  10/2/2019 0873 by Jonathan Jensen RN  Outcome: Progressing  10/2/2019 7531 by Jonathan Jensen RN  Outcome: Progressing

## 2019-10-03 NOTE — ASSESSMENT & PLAN NOTE
14-year-old male with no prior past medical history presents with an abscess and cellulitis between 4th and 5th toe of the right foot, he failed outpatient antibiotics    X-ray negative for osteomyelitis  Wound cultures with GBS, g negative rods  Cellulitis clinically improving still has erythema dorsal foot  Blood cultures so far negative  ID recommendations appreciated  Antibiotics changed to Rocephin  Podiatry consult pending

## 2019-10-04 VITALS
HEIGHT: 71 IN | TEMPERATURE: 98.3 F | BODY MASS INDEX: 36.08 KG/M2 | SYSTOLIC BLOOD PRESSURE: 112 MMHG | RESPIRATION RATE: 18 BRPM | HEART RATE: 78 BPM | DIASTOLIC BLOOD PRESSURE: 72 MMHG | OXYGEN SATURATION: 98 % | WEIGHT: 257.72 LBS

## 2019-10-04 LAB
BACTERIA WND AEROBE CULT: ABNORMAL
BACTERIA WND AEROBE CULT: ABNORMAL
GRAM STN SPEC: ABNORMAL

## 2019-10-04 PROCEDURE — 99239 HOSP IP/OBS DSCHRG MGMT >30: CPT | Performed by: INTERNAL MEDICINE

## 2019-10-04 PROCEDURE — 99233 SBSQ HOSP IP/OBS HIGH 50: CPT | Performed by: INTERNAL MEDICINE

## 2019-10-04 RX ORDER — CLOTRIMAZOLE 1 %
CREAM (GRAM) TOPICAL 2 TIMES DAILY
Qty: 30 G | Refills: 0 | Status: SHIPPED | OUTPATIENT
Start: 2019-10-04

## 2019-10-04 RX ORDER — CEPHALEXIN 500 MG/1
500 CAPSULE ORAL EVERY 6 HOURS SCHEDULED
Qty: 20 CAPSULE | Refills: 0 | Status: SHIPPED | OUTPATIENT
Start: 2019-10-04 | End: 2019-10-09

## 2019-10-04 RX ADMIN — CLOTRIMAZOLE 1 APPLICATION: 10 CREAM TOPICAL at 11:20

## 2019-10-04 NOTE — PLAN OF CARE
Problem: PAIN - ADULT  Goal: Verbalizes/displays adequate comfort level or baseline comfort level  Description  Interventions:  - Encourage patient to monitor pain and request assistance  - Assess pain using appropriate pain scale  - Administer analgesics based on type and severity of pain and evaluate response  - Implement non-pharmacological measures as appropriate and evaluate response  - Consider cultural and social influences on pain and pain management  - Notify physician/advanced practitioner if interventions unsuccessful or patient reports new pain  Outcome: Progressing     Problem: INFECTION - ADULT  Goal: Absence or prevention of progression during hospitalization  Description  INTERVENTIONS:  - Assess and monitor for signs and symptoms of infection  - Monitor lab/diagnostic results  - Monitor all insertion sites, i e  indwelling lines, tubes, and drains  - Monitor endotracheal if appropriate and nasal secretions for changes in amount and color  - [place] appropriate cooling/warming therapies per order  - Administer medications as ordered  - Instruct and encourage patient and family to use good hand hygiene technique  - Identify and instruct in appropriate isolation precautions for identified infection/condition  Outcome: Progressing  Goal: Absence of fever/infection during neutropenic period  Description  INTERVENTIONS:  - Monitor WBC    Outcome: Progressing     Problem: SAFETY ADULT  Goal: Patient will remain free of falls  Description  INTERVENTIONS:  - Assess patient frequently for physical needs  -  Identify cognitive and physical deficits and behaviors that affect risk of falls    -  [place] fall precautions as indicated by assessment   - Educate patient/family on patient safety including physical limitations  - Instruct patient to call for assistance with activity based on assessment  - Modify environment to reduce risk of injury  - Consider OT/PT consult to assist with strengthening/mobility  Outcome: Progressing  Goal: Maintain or return to baseline ADL function  Description  INTERVENTIONS:  -  Assess patient's ability to carry out ADLs; assess patient's baseline for ADL function and identify physical deficits which impact ability to perform ADLs (bathing, care of mouth/teeth, toileting, grooming, dressing, etc )  - Assess/evaluate cause of self-care deficits   - Assess range of motion  - Assess patient's mobility; develop plan if impaired  - Assess patient's need for assistive devices and provide as appropriate  - Encourage maximum independence but intervene and supervise when necessary  - Involve family in performance of ADLs  - Assess for home care needs following discharge   - Consider OT consult to assist with ADL evaluation and planning for discharge  - Provide patient education as appropriate  Outcome: Progressing  Goal: Maintain or return mobility status to optimal level  Description  INTERVENTIONS:  - Assess patient's baseline mobility status (ambulation, transfers, stairs, etc )    - Identify cognitive and physical deficits and behaviors that affect mobility  - Identify mobility aids required to assist with transfers and/or ambulation (gait belt, sit-to-stand, lift, walker, cane, etc )  - [place] fall precautions as indicated by assessment  - Record patient progress and toleration of activity level on Mobility SBAR; progress patient to next Phase/Stage  - Instruct patient to call for assistance with activity based on assessment  - Consider rehabilitation consult to assist with strengthening/weightbearing, etc   Outcome: Progressing     Problem: DISCHARGE PLANNING  Goal: Discharge to home or other facility with appropriate resources  Description  INTERVENTIONS:  - Identify barriers to discharge w/patient and caregiver  - Arrange for needed discharge resources and transportation as appropriate  - Identify discharge learning needs (meds, wound care, etc )  - Arrange for interpretive services to assist at discharge as needed  - Refer to Case Management Department for coordinating discharge planning if the patient needs post-hospital services based on physician/advanced practitioner order or complex needs related to functional status, cognitive ability, or social support system  Outcome: Progressing     Problem: Knowledge Deficit  Goal: Patient/family/caregiver demonstrates understanding of disease process, treatment plan, medications, and discharge instructions  Description  Complete learning assessment and assess knowledge base    Interventions:  - Provide teaching at level of understanding  - Provide teaching via preferred learning methods  Outcome: Progressing

## 2019-10-04 NOTE — DISCHARGE SUMMARY
[facility]    Patient Information: Shelly Knight 35 y o  male MRN: [id number]  Unit/Bed#: -01 Encounter: [id number]    Discharging Physician / Practitioner: Chay Pardo MD  PCP: No primary care provider on file  Admission Date: 10/2/2019  Discharge Date: 10/04/19    Disposition:     Home    Reason for Admission:     Discharge Diagnoses:     Principal Problem:    Cellulitis and abscess of toe of right foot  Active Problems:    Tinea pedis    Morbid obesity due to excess calories ([address] )  Resolved Problems:    * No resolved hospital problems  *      Consultations During Hospital Stay:  · Id  · podiatry    Procedures Performed:     · None    Significant Findings / Test Results:     X-ray of the foot no acute abnormality  Incidental Findings:   · None    Test Results Pending at Discharge (will require follow up): · None     Outpatient Tests Requested:  · None    Complications:  None    Hospital Course:     Shelly Knight is a 35 y o  male patient with no significant past medical history who originally presented to the hospital on 10/2/2019 due to 2 day history of right foot swelling and pain  He was in the ER couple days ago who was discharged with Keflex and Bactrim however presented with worsening symptoms  In the emergency department he underwent I&D and was started on IV antibiotics  X-ray of the foot did not demonstrate any bony abnormality  Patient was evaluated by Podiatry and ID in the hospital   Podiatry recommended wound care instructions and close outpatient follow-up after discharge  Infectious Disease assisted in antibiotic management  Patient was able to ambulate without any difficulty  Recommended close outpatient follow-up    Condition at Discharge: stable     Discharge Day Visit / Exam:     Subjective:  Patient seen and examined  He feels better today  Denies any pain in his leg    Anxious for discharge  Vitals: Blood Pressure: 112/72 (10/04/19 4128)  Pulse: 78 (10/04/19 0751)  Temperature: 98 3 °F (36 8 °C) (10/04/19 0751)  Temp Source: Oral (10/04/19 0751)  Respirations: 18 (10/04/19 0751)  Height: 5' 11" (180 3 cm) (10/02/19 1342)  Weight - Scale: 117 kg (257 lb 11 5 oz) (10/02/19 1342)  SpO2: 98 % (10/04/19 0751)  Exam:   Physical Exam   Constitutional: He is oriented to person, place, and time  He appears well-developed and well-nourished  HENT:   Head: Normocephalic and atraumatic  Eyes: Pupils are equal, round, and reactive to light  EOM are normal    Neck: Normal range of motion  Neck supple  Cardiovascular: Normal rate and regular rhythm  Pulmonary/Chest: Effort normal and breath sounds normal    Abdominal: Soft  Bowel sounds are normal    Musculoskeletal:   Right foot wrapped in surgical bandage   Neurological: He is alert and oriented to person, place, and time  Skin: Skin is warm and dry  Discussion with Family:  Patient    Discharge instructions/Information to patient and family:   See after visit summary for information provided to patient and family  Provisions for Follow-Up Care:  See after visit summary for information related to follow-up care and any pertinent home health orders  Planned Readmission:  None     Discharge Statement:  I spent 35 minutes discharging the patient  This time was spent on the day of discharge  I had direct contact with the patient on the day of discharge  Greater than 50% of the total time was spent examining patient, answering all patient questions, arranging and discussing plan of care with patient as well as directly providing post-discharge instructions  Additional time then spent on discharge activities  Discharge Medications:  See after visit summary for reconciled discharge medications provided to patient and family        ** Please Note: This note has been constructed using a voice recognition system **

## 2019-10-04 NOTE — PROGRESS NOTES
Progress Note - Infectious Disease   Isaiah Santoyo 35 y o  male MRN: [id number]  Unit/Bed#: -01 Encounter: [id number]      Impression/Recommendations:  1   Right foot cellulitis/abscess   Due to # 3  Lack of clinical improvement despite Keflex, Bactrim likely due to need for I&D, IV antibiotics   X-ray negative for osteomyelitis   Wound culture with GBS, E  coli  Blood cultures negative  Clinically stable without sepsis  Markedly improved  Rec:  ? OK form ID for D/C home on Keflex 500 mg PO Q6 to continue through 10/9 for 7 days total of GNR coverage (8 days total of antibiotics)  ? [address] per Podiatry with close outpatient follow-up     2   Leukocytosis   Due to # 1  Does not need formal sepsis criteria   Blood cultures pending   Clinically stable and nontoxic   Improved  Rec:  ? Continue antibiotics as above     3   Tinea pedis   Bilateral 4/5 interdigital web space  Rec:  ? Topical antifungals     The above plan was discussed in detail with the patient and Dr Maritza Woody      Antibiotics:  Ceftriaxone #2    Subjective:  Patient seen on AM rounds  Denies fevers, chills, sweats, nausea, vomiting, or diarrhea  Foot feels better  Able to ambulate without pain  Ready to go home  24 Hour Events:  No documented fevers, chills, sweats, nausea, vomiting, or diarrhea      Objective:  Vitals:  Temp:  [98 2 °F (36 8 °C)-98 4 °F (36 9 °C)] 98 3 °F (36 8 °C)  HR:  [74-87] 78  Resp:  [18] 18  BP: (112-153)/(62-88) 112/72  SpO2:  [98 %-99 %] 98 %  Temp (24hrs), Av 3 °F (36 8 °C), Min:98 2 °F (36 8 °C), Max:98 4 °F (36 9 °C)  Current: Temperature: 98 3 °F (36 8 °C)    Physical Exam:   General:  No acute distress, ambulating in room  Psychiatric:  Awake and alert  Pulmonary:  Normal respiratory excursion without accessory muscle use  Abdomen:  Soft, nontender  Extremities:  No edema  Skin:  No rashes  Right foot:  Decreased erythema dorsal surface, wound clean    Lab Results:  I have personally reviewed pertinent labs   Results from last 7 days   Lab Units 10/03/19  0506 10/02/19  0810 10/02/19  0204   POTASSIUM mmol/L 4 1 3 9 4 0   CHLORIDE mmol/L 106 104 102   CO2 mmol/L 25 26 25   BUN mg/dL 8 5 7   CREATININE mg/dL 0 99 1 07 1 26   EGFR ml/min/1 73sq m 95 86 71   CALCIUM mg/dL 8 6 8 2* 8 8   AST U/L  --  14 19   ALT U/L  --  23 30   ALK PHOS U/L  --  58 65     Results from last 7 days   Lab Units 10/02/19  0810 10/02/19  0204   WBC Thousand/uL 12 90* 14 24*   HEMOGLOBIN g/dL 13 9 15 2   PLATELETS Thousands/uL 331 380     Results from last 7 days   Lab Units 10/02/19  0257 10/02/19  0220   BLOOD CULTURE   --  No Growth at 48 hrs  No Growth at 48 hrs  GRAM STAIN RESULT  No polys seen*  1+ Epithelial Cells*  1+ Gram positive cocci in pairs*  --    WOUND CULTURE  2+ Growth of Escherichia coli*  2+ Growth of Beta Hemolytic Streptococcus Group B*  --        Imaging Studies:   I have personally reviewed pertinent imaging study reports and images in PACS  EKG, Pathology, and Other Studies:   I have personally reviewed pertinent reports

## 2019-10-07 LAB
BACTERIA BLD CULT: NORMAL
BACTERIA BLD CULT: NORMAL

## 2022-08-07 ENCOUNTER — APPOINTMENT (EMERGENCY)
Dept: RADIOLOGY | Facility: HOSPITAL | Age: 42
End: 2022-08-07

## 2022-08-07 ENCOUNTER — HOSPITAL ENCOUNTER (EMERGENCY)
Facility: HOSPITAL | Age: 42
Discharge: HOME | End: 2022-08-07
Attending: EMERGENCY MEDICINE

## 2022-08-07 VITALS
RESPIRATION RATE: 19 BRPM | TEMPERATURE: 98.2 F | SYSTOLIC BLOOD PRESSURE: 131 MMHG | DIASTOLIC BLOOD PRESSURE: 85 MMHG | HEART RATE: 80 BPM | OXYGEN SATURATION: 100 %
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
## 2022-08-07 DIAGNOSIS — S52.125A CLOSED NONDISPLACED FRACTURE OF HEAD OF LEFT RADIUS, INITIAL ENCOUNTER: ICD-10-CM

## 2022-08-07 PROCEDURE — 73090 X-RAY EXAM OF FOREARM: CPT

## 2022-08-07 PROCEDURE — 73110 X-RAY EXAM OF WRIST: CPT

## 2022-08-07 PROCEDURE — 99284 EMERGENCY DEPT VISIT MOD MDM: CPT | Performed by: SURGERY

## 2022-08-07 PROCEDURE — 99283 EMERGENCY DEPT VISIT LOW MDM: CPT

## 2022-08-07 RX ORDER — NAPROXEN 500 MG/1
500 TABLET ORAL 2 TIMES DAILY WITH MEALS
Qty: 14 TABLET | Refills: 0 | Status: SHIPPED | OUTPATIENT
Start: 2022-08-07 | End: 2022-08-14

## 2022-08-07 RX ORDER — NAPROXEN 250 MG/1
500 TABLET ORAL ONCE
Status: COMPLETED | OUTPATIENT
Start: 2022-08-07 | End: 2022-08-07

## 2022-08-07 RX ADMIN — NAPROXEN 500 MG: 250 TABLET ORAL at 03:41
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
## 2022-08-07 NOTE — DISCHARGE INSTRUCTIONS
Please return with any new or worsening symptoms  Please follow-up with orthopedics within next 1 week for re-evaluation  Continue use of sling throughout the day

## 2023-06-27 ENCOUNTER — HOSPITAL ENCOUNTER (EMERGENCY)
Facility: HOSPITAL | Age: 43
Discharge: HOME | End: 2023-06-27
Attending: EMERGENCY MEDICINE

## 2023-06-27 VITALS
HEART RATE: 75 BPM | DIASTOLIC BLOOD PRESSURE: 77 MMHG | RESPIRATION RATE: 18 BRPM | SYSTOLIC BLOOD PRESSURE: 125 MMHG | TEMPERATURE: 97.8 F | OXYGEN SATURATION: 97 %

## 2023-06-27 DIAGNOSIS — N48.6 PEYRONIE'S SYNDROME: Primary | ICD-10-CM

## 2023-06-27 PROCEDURE — 99282 EMERGENCY DEPT VISIT SF MDM: CPT

## 2023-07-03 ENCOUNTER — TELEPHONE (OUTPATIENT)
Dept: UROLOGY | Facility: AMBULATORY SURGERY CENTER | Age: 43
End: 2023-07-03

## 2023-07-03 NOTE — TELEPHONE ENCOUNTER
Pt called to make a ER follow up visit. Agreed to 8/9 830 with Pearl at [facility] office.

## 2023-07-05 ENCOUNTER — HOSPITAL ENCOUNTER (EMERGENCY)
Facility: HOSPITAL | Age: 43
Discharge: HOME | End: 2023-07-05
Attending: EMERGENCY MEDICINE | Admitting: EMERGENCY MEDICINE

## 2023-07-05 ENCOUNTER — APPOINTMENT (EMERGENCY)
Dept: CT IMAGING | Facility: HOSPITAL | Age: 43
End: 2023-07-05

## 2023-07-05 VITALS
OXYGEN SATURATION: 98 % | TEMPERATURE: 97.7 F | HEART RATE: 62 BPM | RESPIRATION RATE: 18 BRPM | DIASTOLIC BLOOD PRESSURE: 72 MMHG | SYSTOLIC BLOOD PRESSURE: 114 MMHG

## 2023-07-05 DIAGNOSIS — R10.9 ABDOMINAL PAIN: Primary | ICD-10-CM

## 2023-07-05 LAB
ALBUMIN SERPL BCP-MCNC: 4 G/DL (ref 3.5–5)
ALP SERPL-CCNC: 62 U/L (ref 34–104)
ALT SERPL W P-5'-P-CCNC: 19 U/L (ref 7–52)
ANION GAP SERPL CALCULATED.3IONS-SCNC: 6 MMOL/L
AST SERPL W P-5'-P-CCNC: 14 U/L (ref 13–39)
BASOPHILS # BLD AUTO: 0.06 THOUSANDS/ÂΜL (ref 0–0.1)
BASOPHILS NFR BLD AUTO: 1 % (ref 0–1)
BILIRUB SERPL-MCNC: 0.29 MG/DL (ref 0.2–1)
BILIRUB UR QL STRIP: NEGATIVE
BUN SERPL-MCNC: 11 MG/DL (ref 5–25)
CALCIUM SERPL-MCNC: 9.5 MG/DL (ref 8.4–10.2)
CHLORIDE SERPL-SCNC: 106 MMOL/L (ref 96–108)
CLARITY UR: CLEAR
CO2 SERPL-SCNC: 24 MMOL/L (ref 21–32)
COLOR UR: YELLOW
CREAT SERPL-MCNC: 0.86 MG/DL (ref 0.6–1.3)
EOSINOPHIL # BLD AUTO: 0.1 THOUSAND/ÂΜL (ref 0–0.61)
EOSINOPHIL NFR BLD AUTO: 1 % (ref 0–6)
ERYTHROCYTE [DISTWIDTH] IN BLOOD BY AUTOMATED COUNT: 12.7 % (ref 11.6–15.1)
GFR SERPL CREATININE-BSD FRML MDRD: 105 ML/MIN/1.73SQ M
GLUCOSE SERPL-MCNC: 105 MG/DL (ref 65–140)
GLUCOSE UR STRIP-MCNC: NEGATIVE MG/DL
HCT VFR BLD AUTO: 47.5 % (ref 36.5–49.3)
HGB BLD-MCNC: 16.2 G/DL (ref 12–17)
HGB UR QL STRIP.AUTO: NEGATIVE
IMM GRANULOCYTES # BLD AUTO: 0.06 THOUSAND/UL (ref 0–0.2)
IMM GRANULOCYTES NFR BLD AUTO: 1 % (ref 0–2)
KETONES UR STRIP-MCNC: NEGATIVE MG/DL
LEUKOCYTE ESTERASE UR QL STRIP: NEGATIVE
LIPASE SERPL-CCNC: 65 U/L (ref 11–82)
LYMPHOCYTES # BLD AUTO: 2.17 THOUSANDS/ÂΜL (ref 0.6–4.47)
LYMPHOCYTES NFR BLD AUTO: 21 % (ref 14–44)
MCH RBC QN AUTO: 30.5 PG (ref 26.8–34.3)
MCHC RBC AUTO-ENTMCNC: 34.1 G/DL (ref 31.4–37.4)
MCV RBC AUTO: 90 FL (ref 82–98)
MONOCYTES # BLD AUTO: 0.76 THOUSAND/ÂΜL (ref 0.17–1.22)
MONOCYTES NFR BLD AUTO: 7 % (ref 4–12)
NEUTROPHILS # BLD AUTO: 7.14 THOUSANDS/ÂΜL (ref 1.85–7.62)
NEUTS SEG NFR BLD AUTO: 69 % (ref 43–75)
NITRITE UR QL STRIP: NEGATIVE
NRBC BLD AUTO-RTO: 0 /100 WBCS
PH UR STRIP.AUTO: 6.5 [PH]
PLATELET # BLD AUTO: 391 THOUSANDS/UL (ref 149–390)
PMV BLD AUTO: 8.6 FL (ref 8.9–12.7)
POTASSIUM SERPL-SCNC: 4.4 MMOL/L (ref 3.5–5.3)
PROT SERPL-MCNC: 7 G/DL (ref 6.4–8.4)
PROT UR STRIP-MCNC: NEGATIVE MG/DL
RBC # BLD AUTO: 5.31 MILLION/UL (ref 3.88–5.62)
SODIUM SERPL-SCNC: 136 MMOL/L (ref 135–147)
SP GR UR STRIP.AUTO: 1.01 (ref 1–1.03)
UROBILINOGEN UR QL STRIP.AUTO: 0.2 E.U./DL
WBC # BLD AUTO: 10.29 THOUSAND/UL (ref 4.31–10.16)

## 2023-07-05 PROCEDURE — 74177 CT ABD & PELVIS W/CONTRAST: CPT

## 2023-07-05 PROCEDURE — 81003 URINALYSIS AUTO W/O SCOPE: CPT

## 2023-07-05 PROCEDURE — 80053 COMPREHEN METABOLIC PANEL: CPT

## 2023-07-05 PROCEDURE — 85025 COMPLETE CBC W/AUTO DIFF WBC: CPT

## 2023-07-05 PROCEDURE — 36415 COLL VENOUS BLD VENIPUNCTURE: CPT

## 2023-07-05 PROCEDURE — 83690 ASSAY OF LIPASE: CPT

## 2023-07-05 RX ORDER — DICYCLOMINE HCL 20 MG
20 TABLET ORAL ONCE
Status: COMPLETED | OUTPATIENT
Start: 2023-07-05 | End: 2023-07-05

## 2023-07-05 RX ADMIN — IOHEXOL 100 ML: 350 INJECTION, SOLUTION INTRAVENOUS at 14:01

## 2023-07-05 RX ADMIN — SODIUM CHLORIDE 1000 ML: 0.9 INJECTION, SOLUTION INTRAVENOUS at 13:21

## 2023-07-05 RX ADMIN — DICYCLOMINE HYDROCHLORIDE 20 MG: 20 TABLET ORAL at 13:20

## 2023-07-05 NOTE — DISCHARGE INSTRUCTIONS
Follow up with PCP  BRAT diet  Return to the ED with new or worsening symptoms including but not limited to worsening pain, decreased oral intake, urinary symptoms

## 2023-07-05 NOTE — ED PROVIDER NOTES
History  Chief Complaint   Patient presents with   • Abdominal Pain     Patient reports abdominal pain, vomiting and chills since 8 am today. +n/v, -diarrhea     Patient is a 77-year-old male with no significant past medical history presenting to the emergency department for evaluation of abdominal pain. Patient reports this morning he woke up with abdominal pain. Patient reports he made himself vomit by putting his finger to the back of his throat. Patient reports he did have chills this morning but did not take his temperature. Patient reports he has been vomiting since this morning, reports it is all bile. Patient reports he did have diarrhea once this morning. Patient denies any recent sick contacts. Patient reports yesterday he felt at baseline. Denies fevers, chills, rash, headache, weakness, dizziness, visual changes, constipation, chest pain, shortness of breath or difficulty breathing. Does not offer any other concerns or complaints. Prior to Admission Medications   Prescriptions Last Dose Informant Patient Reported? Taking? clotrimazole (LOTRIMIN) 1 % cream   No No   Sig: Apply topically 2 (two) times a day   naproxen (Naprosyn) 500 mg tablet   No No   Sig: Take 1 tablet (500 mg total) by mouth 2 (two) times a day with meals for 7 days      Facility-Administered Medications: None       History reviewed. No pertinent past medical history. History reviewed. No pertinent surgical history. History reviewed. No pertinent family history. I have reviewed and agree with the history as documented. E-Cigarette/Vaping     E-Cigarette/Vaping Substances     Social History     Tobacco Use   • Smoking status: Every Day     Packs/day: 0.25     Years: 30.00     Total pack years: 7.50     Types: Cigarettes   • Smokeless tobacco: Never   Substance Use Topics   • Alcohol use: Not Currently   • Drug use: Yes     Types: Marijuana       Review of Systems   Constitutional: Positive for chills.  Negative for fever. HENT: Negative for ear pain and sore throat. Eyes: Negative for pain and visual disturbance. Respiratory: Negative for cough and shortness of breath. Cardiovascular: Negative for chest pain and palpitations. Gastrointestinal: Positive for abdominal pain, diarrhea, nausea and vomiting. Negative for blood in stool, constipation and rectal pain. Genitourinary: Negative for dysuria and hematuria. Musculoskeletal: Negative for arthralgias and back pain. Skin: Negative for color change and rash. Neurological: Negative for seizures and syncope. All other systems reviewed and are negative. Physical Exam  Physical Exam  Vitals and nursing note reviewed. Constitutional:       General: He is not in acute distress. Appearance: Normal appearance. He is well-developed. He is not ill-appearing, toxic-appearing or diaphoretic. HENT:      Head: Normocephalic and atraumatic. Right Ear: External ear normal.      Left Ear: External ear normal.      Nose: Nose normal.      Mouth/Throat:      Mouth: Mucous membranes are moist.   Eyes:      General: No scleral icterus. Right eye: No discharge. Left eye: No discharge. Conjunctiva/sclera: Conjunctivae normal.   Cardiovascular:      Rate and Rhythm: Normal rate and regular rhythm. Heart sounds: No murmur heard. Pulmonary:      Effort: Pulmonary effort is normal. No respiratory distress. Breath sounds: Normal breath sounds. Abdominal:      Palpations: Abdomen is soft. Tenderness: There is abdominal tenderness in the suprapubic area. Musculoskeletal:         General: No swelling, deformity or signs of injury. Normal range of motion. Cervical back: Normal range of motion and neck supple. No rigidity. Skin:     General: Skin is warm and dry. Capillary Refill: Capillary refill takes less than 2 seconds. Coloration: Skin is not jaundiced. Findings: No erythema or rash.    Neurological: General: No focal deficit present. Mental Status: He is alert and oriented to person, place, and time. Mental status is at baseline. Cranial Nerves: No cranial nerve deficit. Gait: Gait normal.   Psychiatric:         Mood and Affect: Mood normal.         Behavior: Behavior normal.         Thought Content:  Thought content normal.         Judgment: Judgment normal.         Vital Signs  ED Triage Vitals [07/05/23 1219]   Temperature Pulse Respirations Blood Pressure SpO2   98.2 °F (36.8 °C) 70 20 125/85 99 %      Temp Source Heart Rate Source Patient Position - Orthostatic VS BP Location FiO2 (%)   Temporal Monitor Sitting Right arm --      Pain Score       --           Vitals:    07/05/23 1219 07/05/23 1435   BP: 125/85 117/57   Pulse: 70 60   Patient Position - Orthostatic VS: Sitting Lying         Visual Acuity      ED Medications  Medications   sodium chloride 0.9 % bolus 1,000 mL (1,000 mL Intravenous New Bag 7/5/23 1321)   dicyclomine (BENTYL) tablet 20 mg (20 mg Oral Given 7/5/23 1320)   iohexol (OMNIPAQUE) 350 MG/ML injection (SINGLE-DOSE) 100 mL (100 mL Intravenous Given 7/5/23 1401)       Diagnostic Studies  Results Reviewed     Procedure Component Value Units Date/Time    UA w Reflex to Microscopic w Reflex to Culture [[id number]] Collected: 07/05/23 1437    Lab Status: Final result Specimen: Urine, Clean Catch Updated: 07/05/23 1522     Color, UA Yellow     Clarity, UA Clear     Specific Gravity, UA 1.010     pH, UA 6.5     Leukocytes, UA Negative     Nitrite, UA Negative     Protein, UA Negative mg/dl      Glucose, UA Negative mg/dl      Ketones, UA Negative mg/dl      Urobilinogen, UA 0.2 E.U./dl      Bilirubin, UA Negative     Occult Blood, UA Negative    Comprehensive metabolic panel [[id number]] Collected: 07/05/23 1318    Lab Status: Final result Specimen: Blood from Arm, Right Updated: 07/05/23 1352     Sodium 136 mmol/L      Potassium 4.4 mmol/L      Chloride 106 mmol/L      CO2 24 mmol/L      ANION GAP 6 mmol/L      BUN 11 mg/dL      Creatinine 0.86 mg/dL      Glucose 105 mg/dL      Calcium 9.5 mg/dL      AST 14 U/L      ALT 19 U/L      Alkaline Phosphatase 62 U/L      Total Protein 7.0 g/dL      Albumin 4.0 g/dL      Total Bilirubin 0.29 mg/dL      eGFR 105 ml/min/1.73sq m     Narrative:      National Kidney Disease Foundation guidelines for Chronic Kidney Disease (CKD):   •  Stage 1 with normal or high GFR (GFR > 90 mL/min/1.73 square meters)  •  Stage 2 Mild CKD (GFR = 60-89 mL/min/1.73 square meters)  •  Stage 3A Moderate CKD (GFR = 45-59 mL/min/1.73 square meters)  •  Stage 3B Moderate CKD (GFR = 30-44 mL/min/1.73 square meters)  •  Stage 4 Severe CKD (GFR = 15-29 mL/min/1.73 square meters)  •  Stage 5 End Stage CKD (GFR <15 mL/min/1.73 square meters)  Note: GFR calculation is accurate only with a steady state creatinine    Lipase [[id number]]  (Normal) Collected: 07/05/23 1318    Lab Status: Final result Specimen: Blood from Arm, Right Updated: 07/05/23 1352     Lipase 65 u/L     CBC and differential [[id number]]  (Abnormal) Collected: 07/05/23 1318    Lab Status: Final result Specimen: Blood from Arm, Right Updated: 07/05/23 1324     WBC 10.29 Thousand/uL      RBC 5.31 Million/uL      Hemoglobin 16.2 g/dL      Hematocrit 47.5 %      MCV 90 fL      MCH 30.5 pg      MCHC 34.1 g/dL      RDW 12.7 %      MPV 8.6 fL      Platelets 589 Thousands/uL      nRBC 0 /100 WBCs      Neutrophils Relative 69 %      Immat GRANS % 1 %      Lymphocytes Relative 21 %      Monocytes Relative 7 %      Eosinophils Relative 1 %      Basophils Relative 1 %      Neutrophils Absolute 7.14 Thousands/µL      Immature Grans Absolute 0.06 Thousand/uL      Lymphocytes Absolute 2.17 Thousands/µL      Monocytes Absolute 0.76 Thousand/µL      Eosinophils Absolute 0.10 Thousand/µL      Basophils Absolute 0.06 Thousands/µL                  CT abdomen pelvis with contrast   Final Result by Maurice King MD (07/05 1508) No acute inflammatory process in the abdomen or pelvis. Workstation performed: [id number]                    Procedures  Procedures         ED Course  ED Course as of 07/05/23 1533   Wed Jul 05, 2023   1524 Nitrite, UA: Negative   1524 Leukocytes, UA: Negative       Medical Decision Making    This is a 21-year-old male presenting to the emergency department for evaluation of abdominal pain. Patient states this morning he woke up with abdominal pain. Patient reports he made himself vomit to alleviate the discomfort. Patient denies any alleviation of the pain. Patient reports he did take 600 mg ibuprofen which has helped with the pain. Patient reports he did have 1 episode of diarrhea this morning. Patient denies any recent sick contacts. Patient is in no acute distress, stable vital signs on initial examination. Differential diagnosis to include but is not limited to: Gastroenteritis, diverticulitis, UTI, cystitis    Initial ED Plan: CBC, CMP, lipase, UA, CT and pelvis    ED results: No acute finding on CT abdomen pelvis  UA- negative for nitrites or leukocytes. Patient reports alleviation of pain after medication. Final ED assessment: Patient is stable and well appearing. Discussed radiologic studies and laboratory results. Discussed follow up with PCP. Discussed BRAT diet. Strict return precautions were discussed including but not limited to worsening pain, decreased oral intake, urinary symptoms. Patient verbalized understanding and is agreeable with the plan for discharge. Amount and/or Complexity of Data Reviewed  Labs: ordered. Radiology: ordered. Risk  Prescription drug management.           Disposition  Final diagnoses:   Abdominal pain     Time reflects when diagnosis was documented in both MDM as applicable and the Disposition within this note     Time User Action Codes Description Comment    7/5/2023  3:29 PM Raquel Hickey Add [R10.9] Abdominal pain       ED Disposition     ED Disposition   Discharge    Condition   Stable    Date/Time   Wed Jul 5, 2023  3:29 PM    [place] discharge to home/self care. Follow-up Information     Follow up With Specialties Details Why Contact Info Additional Information    your PCP  Call in 3 days For follow up      [address] [place] [address] Emergency Department Emergency Medicine Go to  If symptoms worsen 35[address] [facility] [address] [facility] Emergency Department, [place], [place], [address]          Patient's Medications   Discharge Prescriptions    No medications on file       No discharge procedures on file.     PDMP Review     None          ED Provider  Electronically Signed by           Chetan Davidson PA-C  07/05/23 1537

## 2023-08-08 ENCOUNTER — TELEPHONE (OUTPATIENT)
Dept: UROLOGY | Facility: CLINIC | Age: 43
End: 2023-08-08

## 2023-08-08 NOTE — PROGRESS NOTES
8/9/2023      Chief Complaint   Patient presents with   • Establish Care     Assessment and Plan    39 y.o. male new patient     1. ED  2. Low libido  -Report slight penile curvature that is painless with erections and partial erections. Advised the curvature could be secondary to inadequate erections versus Peyronie's disease. Possible small left-sided Peyronie's plaque noted on exam.  Will trial Viagra for ED. Also discussed referral to reconstructive urology for assessment and treatment of Peyronie's disease. He wishes to try Viagra first and may consider referral in the future should issue persist.  He also reports low libido and decreased sex drive and will check his testosterone as well. Follow-up in 6 to 8 weeks. History of Present Illness  Yokasta Etienne is a 39 y.o. male here for new patient evaluation of erectile dysfunction. He reports issues began in June of this year. He reports he is only achieving partial erections and notices a slight curvature to the left with erections that is painless. Denies any painful masses or lumps on the penis. Denies any pelvic injury or trauma. He also notes issues with low libido. He denies any medical history. Review of Systems   Constitutional: Negative for chills and fever. Respiratory: Negative for shortness of breath. Cardiovascular: Negative for chest pain. Gastrointestinal: Negative for abdominal pain. Genitourinary: Negative for difficulty urinating, dysuria, flank pain, frequency, hematuria, penile pain, penile swelling and urgency. Neurological: Negative for dizziness. Past Medical History  History reviewed. No pertinent past medical history. Past Social History  History reviewed. No pertinent surgical history.   Social History     Tobacco Use   Smoking Status Every Day   • Packs/day: 0.25   • Years: 30.00   • Total pack years: 7.50   • Types: Cigarettes   Smokeless Tobacco Never       Past Family History  History reviewed. No pertinent family history. Past Social history  Social History     Socioeconomic History   • Marital status: Single     Spouse name: Not on file   • Number of children: Not on file   • Years of education: Not on file   • Highest education level: Not on file   Occupational History   • Not on file   Tobacco Use   • Smoking status: Every Day     Packs/day: 0.25     Years: 30.00     Total pack years: 7.50     Types: Cigarettes   • Smokeless tobacco: Never   Substance and Sexual Activity   • Alcohol use: Not Currently   • Drug use: Yes     Types: Marijuana   • Sexual activity: Yes     Partners: Female   Other Topics Concern   • Not on file   Social History Narrative   • Not on file     Social Determinants of Health     Financial Resource Strain: Not on file   Food Insecurity: Not on file   Transportation Needs: Not on file   Physical Activity: Not on file   Stress: Not on file   Social Connections: Not on file   Intimate Partner Violence: Not on file   Housing Stability: Not on file       Current Medications  Current Outpatient Medications   Medication Sig Dispense Refill   • clotrimazole (LOTRIMIN) 1 % cream Apply topically 2 (two) times a day 30 g 0   • sildenafil (VIAGRA) 50 MG tablet Take 1 tablet (50 mg total) by mouth as needed for erectile dysfunction . May take up to two tablets 30-60 minute prior to intercourse 30 tablet 0   • naproxen (Naprosyn) 500 mg tablet Take 1 tablet (500 mg total) by mouth 2 (two) times a day with meals for 7 days 14 tablet 0     No current facility-administered medications for this visit.        Allergies  No Known Allergies      The following portions of the patient's history were reviewed and updated as appropriate: allergies, current medications, past medical history, past social history, past surgical history and problem list.      Vitals  Vitals:    08/09/23 0835   BP: 130/70   BP Location: Left arm   Patient Position: Sitting   Cuff Size: Large   Pulse: 75   Resp: 16 SpO2: 96%   Weight: 104 kg (229 lb 6.4 oz)   Height: 5' 10" (1.778 m)           Physical Exam  Physical Exam  Constitutional:       Appearance: Normal appearance. HENT:      Head: Normocephalic and atraumatic. Right Ear: External ear normal.      Left Ear: External ear normal.      Nose: Nose normal.   Eyes:      General: No scleral icterus. Conjunctiva/sclera: Conjunctivae normal.   Cardiovascular:      Pulses: Normal pulses. Pulmonary:      Effort: Pulmonary effort is normal.   Genitourinary:     Comments: Uncircumcised phallus. Possible small left sided Peyronie's plaque. No tenderness. No swelling or erythema. Musculoskeletal:         General: Normal range of motion. Cervical back: Normal range of motion. Skin:     General: Skin is warm and dry. Neurological:      General: No focal deficit present. Mental Status: He is alert and oriented to person, place, and time. Psychiatric:         Mood and Affect: Mood normal.         Behavior: Behavior normal.         Thought Content: Thought content normal.         Judgment: Judgment normal.           Results  No results found for this or any previous visit (from the past 1 hour(s)). ]  No results found for: "PSA"  Lab Results   Component Value Date    CALCIUM 9.5 07/05/2023    K 4.4 07/05/2023    CO2 24 07/05/2023     07/05/2023    BUN 11 07/05/2023    CREATININE 0.86 07/05/2023     Lab Results   Component Value Date    WBC 10.29 (H) 07/05/2023    HGB 16.2 07/05/2023    HCT 47.5 07/05/2023    MCV 90 07/05/2023     (H) 07/05/2023           Orders  Orders Placed This Encounter   Procedures   • Testosterone, free, total     This is a patient instruction: Fasting preferred. Collections for men not undergoing treatment must be completed between 7am-9am ONLY. Collection time restrictions are not applicable to women or men already undergoing treatment.      Standing Status:   Future     Standing Expiration Date:   8/9/2024       [employer] [email or web address]

## 2023-08-09 ENCOUNTER — OFFICE VISIT (OUTPATIENT)
Dept: UROLOGY | Facility: CLINIC | Age: 43
End: 2023-08-09

## 2023-08-09 VITALS
HEART RATE: 75 BPM | RESPIRATION RATE: 16 BRPM | WEIGHT: 229.4 LBS | OXYGEN SATURATION: 96 % | BODY MASS INDEX: 32.84 KG/M2 | SYSTOLIC BLOOD PRESSURE: 130 MMHG | HEIGHT: 70 IN | DIASTOLIC BLOOD PRESSURE: 70 MMHG

## 2023-08-09 DIAGNOSIS — N48.89 PENILE CURVATURE, ACQUIRED: ICD-10-CM

## 2023-08-09 DIAGNOSIS — R68.82 LOW LIBIDO: ICD-10-CM

## 2023-08-09 DIAGNOSIS — N52.8 OTHER MALE ERECTILE DYSFUNCTION: Primary | ICD-10-CM

## 2023-08-09 PROCEDURE — 99203 OFFICE O/P NEW LOW 30 MIN: CPT | Performed by: PHYSICIAN ASSISTANT

## 2023-08-09 RX ORDER — SILDENAFIL 50 MG/1
50 TABLET, FILM COATED ORAL AS NEEDED
Qty: 30 TABLET | Refills: 0 | Status: SHIPPED | OUTPATIENT
Start: 2023-08-09

## 2023-09-20 ENCOUNTER — OFFICE VISIT (OUTPATIENT)
Dept: UROLOGY | Facility: CLINIC | Age: 43
End: 2023-09-20

## 2023-09-20 VITALS
HEART RATE: 84 BPM | DIASTOLIC BLOOD PRESSURE: 74 MMHG | BODY MASS INDEX: 33.01 KG/M2 | SYSTOLIC BLOOD PRESSURE: 128 MMHG | RESPIRATION RATE: 18 BRPM | HEIGHT: 70 IN | OXYGEN SATURATION: 98 % | WEIGHT: 230.6 LBS

## 2023-09-20 DIAGNOSIS — N52.8 OTHER MALE ERECTILE DYSFUNCTION: Primary | ICD-10-CM

## 2023-09-20 PROCEDURE — 99213 OFFICE O/P EST LOW 20 MIN: CPT | Performed by: PHYSICIAN ASSISTANT

## 2023-09-20 RX ORDER — TADALAFIL 10 MG/1
10 TABLET ORAL AS NEEDED
Qty: 30 TABLET | Refills: 2 | Status: SHIPPED | OUTPATIENT
Start: 2023-09-20

## 2023-09-20 NOTE — PROGRESS NOTES
9/20/2023      Chief Complaint   Patient presents with   • Erectile Dysfunction   • Follow-up     Assessment and Plan    1. ED  2. Low libido  - Failed Viagra due to ineffectiveness and side effects.   - Wishes to trial Cialis, rx sent to pharmacy.   - Obtain testosterone level as ordered. - Reviewed ICI if fails above  - F/u in 6-8 weeks for reassessment. History of Present Illness  Mikel Swenson is a 39 y.o. male here for follow up evaluation of erectile dysfunction. He reports issues began in June of this year. He reports he is only achieving partial erections and notices a slight curvature to the left with erections that is painless. Denies any painful masses or lumps on the penis. Denies any pelvic injury or trauma. He also notes issues with low libido. He denies any medical history. He was started on Viagra at last visit. Reports this was ineffective and caused side effects. Review of Systems   Constitutional: Negative for chills and fever. Respiratory: Negative for shortness of breath. Cardiovascular: Negative for chest pain. Gastrointestinal: Negative for abdominal pain. Genitourinary: Negative for difficulty urinating, dysuria, flank pain, frequency, hematuria, penile pain and urgency. Neurological: Negative for dizziness. Past Medical History  History reviewed. No pertinent past medical history. Past Social History  History reviewed. No pertinent surgical history.   Social History     Tobacco Use   Smoking Status Every Day   • Packs/day: 0.25   • Years: 30.00   • Total pack years: 7.50   • Types: Cigarettes   • Passive exposure: Past   Smokeless Tobacco Never       Past Family History  Family History   Problem Relation Age of Onset   • Heart attack Father    • Stomach cancer Mother        Past Social history  Social History     Socioeconomic History   • Marital status: Single     Spouse name: Not on file   • Number of children: Not on file   • Years of education: Not on file   • Highest education level: Not on file   Occupational History   • Not on file   Tobacco Use   • Smoking status: Every Day     Packs/day: 0.25     Years: 30.00     Total pack years: 7.50     Types: Cigarettes     Passive exposure: Past   • Smokeless tobacco: Never   Vaping Use   • Vaping Use: Never used   Substance and Sexual Activity   • Alcohol use: Not Currently     Comment: occ   • Drug use: Yes     Types: Marijuana   • Sexual activity: Yes     Partners: Female   Other Topics Concern   • Not on file   Social History Narrative   • Not on file     Social Determinants of Health     Financial Resource Strain: Not on file   Food Insecurity: Not on file   Transportation Needs: Not on file   Physical Activity: Not on file   Stress: Not on file   Social Connections: Not on file   Intimate Partner Violence: Not on file   Housing Stability: Not on file       Current Medications  No current outpatient medications on file. No current facility-administered medications for this visit. Allergies  No Known Allergies      The following portions of the patient's history were reviewed and updated as appropriate: allergies, current medications, past medical history, past social history, past surgical history and problem list.      Vitals  Vitals:    09/20/23 1037   BP: 128/74   Pulse: 84   Resp: 18   SpO2: 98%   Weight: 105 kg (230 lb 9.6 oz)   Height: 5' 10" (1.778 m)           Physical Exam  Physical Exam  Constitutional:       Appearance: Normal appearance. HENT:      Head: Normocephalic and atraumatic. Right Ear: External ear normal.      Left Ear: External ear normal.      Nose: Nose normal.   Eyes:      General: No scleral icterus. Conjunctiva/sclera: Conjunctivae normal.   Cardiovascular:      Pulses: Normal pulses. Pulmonary:      Effort: Pulmonary effort is normal.   Musculoskeletal:         General: Normal range of motion. Cervical back: Normal range of motion. Neurological:      General: No focal deficit present. Mental Status: He is alert and oriented to person, place, and time. Psychiatric:         Mood and Affect: Mood normal.         Behavior: Behavior normal.         Thought Content: Thought content normal.         Judgment: Judgment normal.           Results  No results found for this or any previous visit (from the past 1 hour(s)). ]  No results found for: "PSA"  Lab Results   Component Value Date    CALCIUM 9.5 07/05/2023    K 4.4 07/05/2023    CO2 24 07/05/2023     07/05/2023    BUN 11 07/05/2023    CREATININE 0.86 07/05/2023     Lab Results   Component Value Date    WBC 10.29 (H) 07/05/2023    HGB 16.2 07/05/2023    HCT 47.5 07/05/2023    MCV 90 07/05/2023     (H) 07/05/2023           Orders  No orders of the defined types were placed in this encounter.       Linda Love